# Patient Record
Sex: MALE | Race: BLACK OR AFRICAN AMERICAN | NOT HISPANIC OR LATINO | Employment: FULL TIME | URBAN - METROPOLITAN AREA
[De-identification: names, ages, dates, MRNs, and addresses within clinical notes are randomized per-mention and may not be internally consistent; named-entity substitution may affect disease eponyms.]

---

## 2019-10-25 ENCOUNTER — HOSPITAL ENCOUNTER (EMERGENCY)
Facility: HOSPITAL | Age: 29
Discharge: HOME | End: 2019-10-25
Attending: FAMILY MEDICINE

## 2019-10-25 VITALS
HEART RATE: 91 BPM | BODY MASS INDEX: 46.65 KG/M2 | OXYGEN SATURATION: 96 % | TEMPERATURE: 98 F | HEIGHT: 69 IN | WEIGHT: 315 LBS | RESPIRATION RATE: 18 BRPM | SYSTOLIC BLOOD PRESSURE: 142 MMHG | DIASTOLIC BLOOD PRESSURE: 82 MMHG

## 2019-10-25 DIAGNOSIS — M79.604 RIGHT LEG PAIN: ICD-10-CM

## 2019-10-25 DIAGNOSIS — L03.115 CELLULITIS OF RIGHT LOWER EXTREMITY: Primary | ICD-10-CM

## 2019-10-25 LAB
ALBUMIN SERPL BCP-MCNC: 3.6 G/DL (ref 3.5–5.2)
ALP SERPL-CCNC: 70 U/L (ref 55–135)
ALT SERPL W/O P-5'-P-CCNC: 19 U/L (ref 10–44)
ANION GAP SERPL CALC-SCNC: 14 MMOL/L (ref 8–16)
AST SERPL-CCNC: 18 U/L (ref 10–40)
BASOPHILS # BLD AUTO: 0.02 K/UL (ref 0–0.2)
BASOPHILS NFR BLD: 0.1 % (ref 0–1.9)
BILIRUB SERPL-MCNC: 1.4 MG/DL (ref 0.1–1)
BUN SERPL-MCNC: 23 MG/DL (ref 6–20)
CALCIUM SERPL-MCNC: 9.6 MG/DL (ref 8.7–10.5)
CHLORIDE SERPL-SCNC: 102 MMOL/L (ref 95–110)
CO2 SERPL-SCNC: 22 MMOL/L (ref 23–29)
CREAT SERPL-MCNC: 1.7 MG/DL (ref 0.5–1.4)
DIFFERENTIAL METHOD: ABNORMAL
EOSINOPHIL # BLD AUTO: 0 K/UL (ref 0–0.5)
EOSINOPHIL NFR BLD: 0.3 % (ref 0–8)
ERYTHROCYTE [DISTWIDTH] IN BLOOD BY AUTOMATED COUNT: 12.9 % (ref 11.5–14.5)
EST. GFR  (AFRICAN AMERICAN): >60 ML/MIN/1.73 M^2
EST. GFR  (NON AFRICAN AMERICAN): 53 ML/MIN/1.73 M^2
GLUCOSE SERPL-MCNC: 115 MG/DL (ref 70–110)
HCT VFR BLD AUTO: 38.6 % (ref 40–54)
HGB BLD-MCNC: 12.4 G/DL (ref 14–18)
IMM GRANULOCYTES # BLD AUTO: 0.08 K/UL (ref 0–0.04)
IMM GRANULOCYTES NFR BLD AUTO: 0.5 % (ref 0–0.5)
LACTATE SERPL-SCNC: 0.7 MMOL/L (ref 0.5–2.2)
LYMPHOCYTES # BLD AUTO: 2.3 K/UL (ref 1–4.8)
LYMPHOCYTES NFR BLD: 15 % (ref 18–48)
MCH RBC QN AUTO: 26.8 PG (ref 27–31)
MCHC RBC AUTO-ENTMCNC: 32.1 G/DL (ref 32–36)
MCV RBC AUTO: 83 FL (ref 82–98)
MONOCYTES # BLD AUTO: 1.6 K/UL (ref 0.3–1)
MONOCYTES NFR BLD: 10.3 % (ref 4–15)
NEUTROPHILS # BLD AUTO: 11.3 K/UL (ref 1.8–7.7)
NEUTROPHILS NFR BLD: 73.8 % (ref 38–73)
NRBC BLD-RTO: 0 /100 WBC
PLATELET # BLD AUTO: 243 K/UL (ref 150–350)
PMV BLD AUTO: 11.1 FL (ref 9.2–12.9)
POTASSIUM SERPL-SCNC: 4.1 MMOL/L (ref 3.5–5.1)
PROT SERPL-MCNC: 8.3 G/DL (ref 6–8.4)
RBC # BLD AUTO: 4.63 M/UL (ref 4.6–6.2)
SODIUM SERPL-SCNC: 138 MMOL/L (ref 136–145)
WBC # BLD AUTO: 15.29 K/UL (ref 3.9–12.7)

## 2019-10-25 PROCEDURE — 85025 COMPLETE CBC W/AUTO DIFF WBC: CPT

## 2019-10-25 PROCEDURE — 83605 ASSAY OF LACTIC ACID: CPT

## 2019-10-25 PROCEDURE — 87040 BLOOD CULTURE FOR BACTERIA: CPT

## 2019-10-25 PROCEDURE — 80053 COMPREHEN METABOLIC PANEL: CPT

## 2019-10-25 PROCEDURE — 63600175 PHARM REV CODE 636 W HCPCS: Performed by: FAMILY MEDICINE

## 2019-10-25 PROCEDURE — 96365 THER/PROPH/DIAG IV INF INIT: CPT

## 2019-10-25 PROCEDURE — 99284 EMERGENCY DEPT VISIT MOD MDM: CPT | Mod: 25

## 2019-10-25 PROCEDURE — 96366 THER/PROPH/DIAG IV INF ADDON: CPT

## 2019-10-25 PROCEDURE — 25000003 PHARM REV CODE 250: Performed by: FAMILY MEDICINE

## 2019-10-25 RX ORDER — IBUPROFEN 800 MG/1
800 TABLET ORAL
Status: COMPLETED | OUTPATIENT
Start: 2019-10-25 | End: 2019-10-25

## 2019-10-25 RX ORDER — CLINDAMYCIN HYDROCHLORIDE 150 MG/1
450 CAPSULE ORAL EVERY 8 HOURS
Qty: 45 CAPSULE | Refills: 0 | Status: SHIPPED | OUTPATIENT
Start: 2019-10-25 | End: 2019-10-30

## 2019-10-25 RX ORDER — IRBESARTAN 150 MG/1
150 TABLET ORAL
COMMUNITY
Start: 2019-05-27

## 2019-10-25 RX ORDER — ATORVASTATIN CALCIUM 10 MG/1
10 TABLET, FILM COATED ORAL
COMMUNITY
Start: 2019-05-28

## 2019-10-25 RX ADMIN — IBUPROFEN 800 MG: 800 TABLET, FILM COATED ORAL at 04:10

## 2019-10-25 RX ADMIN — VANCOMYCIN HYDROCHLORIDE 3000 MG: 100 INJECTION, POWDER, LYOPHILIZED, FOR SOLUTION INTRAVENOUS at 06:10

## 2019-10-25 NOTE — ED NOTES
Report received from Cat, Rn - Introduced self to patient and updated whiteboard. The patient is resting quietly, eyes closed, arouses easily to stimuli. Airway is open and patent, respirations are spontaneous, normal respiratory effort and rate noted, skin warm and dry, appearance: in no acute distress and resting comfortably. IV is patent and infusing at this time with no signs of infiltration.

## 2019-10-25 NOTE — ED PROVIDER NOTES
SCRIBE #1 NOTE: I, Lorrie Crowell, am scribing for, and in the presence of, Alisia Chavez MD. I have scribed the HPI, ROS, and PEx.     SCRIBE #2 NOTE: I, Rafael Peres, am scribing for, and in the presence of,  Marty Gomez MD. I have scribed the remaining portions of the note not scribed by Scribe #1.     History      Chief Complaint   Patient presents with    Cellulitis     redness, swelling and pain to R lower extremity       Review of patient's allergies indicates:  No Known Allergies     HPI   HPI    10/25/2019, 4:45 AM   History obtained from the patient      History of Present Illness: Jorge Zuñiga is a 29 y.o. male patient who presents to the Emergency Department for evaluation of cellulitis to RLE. Symptoms are constant and moderate in severity. Patient reports having a similar episode 2 years ago and treated with abx. No mitigating or exacerbating factors reported. Associated sxs include RLE swelling and pain. Patient denies any fever, diaphoresis, N/VD, extremity numbness/weakness, CP, SOB, and all other sxs at this time. No prior Tx. No further complaints or concerns at this time.       Arrival mode: Personal vehicle     PCP: Jaya Linder MD     Past Medical History:  Past medical history reviewed not relevant      Past Surgical History:  Past surgical history reviewed not relevant      Family History:  Family history reviewed not relevant    Social History:  Social History     Tobacco Use    Smoking status: Unknown   Substance and Sexual Activity    Alcohol use: Unknown    Drug use: Unknown    Sexual activity: Unknown       ROS   Review of Systems   Constitutional: Negative for diaphoresis and fever.   HENT: Negative for sore throat.    Respiratory: Negative for shortness of breath.    Cardiovascular: Negative for chest pain.   Gastrointestinal: Negative for diarrhea, nausea and vomiting.   Genitourinary: Negative for dysuria.   Musculoskeletal: Negative for back pain.        (+) R  "leg pain + swelling   Skin: Positive for color change (RLE). Negative for rash.   Neurological: Negative for weakness and numbness.   Hematological: Does not bruise/bleed easily.   All other systems reviewed and are negative.    Physical Exam      Initial Vitals [10/25/19 0347]   BP Pulse Resp Temp SpO2   115/70 (!) 112 20 (!) 100.8 °F (38.2 °C) 95 %      MAP       --          Physical Exam  Nursing Notes and Vital Signs Reviewed.  Constitutional: Patient is in no acute distress. Well-developed and well-nourished.  Head: Atraumatic. Normocephalic.  Eyes: PERRL. EOM intact. Conjunctivae are not pale. No scleral icterus.  ENT: Mucous membranes are moist.    Neck: Supple. Full ROM. No lymphadenopathy.  Cardiovascular: Regular rate. Regular rhythm. No murmurs, rubs, or gallops. Distal pulses are 2+ and symmetric.  Pulmonary/Chest: No respiratory distress. Clear to auscultation bilaterally. No wheezing or rales.  Abdominal: Soft and non-distended.  There is no tenderness.  RLE: no evident deformity. There is swelling, erythema, and increased warmth. Tender to palpation. No drainage. No fluctuance. ROM is normal. Cap refill distally is <2 seconds. DP and PT pulses are equal and 2+ bilaterally. No motor deficit. No distal sensory deficit  Musculoskeletal: Moves all extremities. No obvious deformities. No edema. No calf tenderness.  Skin: Warm and dry.  Neurological:  Alert, awake, and appropriate.  Normal speech.  No acute focal neurological deficits are appreciated.  Psychiatric: Normal affect. Good eye contact. Appropriate in content.    ED Course    Procedures  ED Vital Signs:  Vitals:    10/25/19 0347   BP: 115/70   Pulse: (!) 112   Resp: 20   Temp: (!) 100.8 °F (38.2 °C)   TempSrc: Oral   SpO2: 95%   Weight: (!) 168.6 kg (371 lb 11.1 oz)   Height: 5' 9" (1.753 m)       Abnormal Lab Results:  Labs Reviewed   CBC W/ AUTO DIFFERENTIAL - Abnormal; Notable for the following components:       Result Value    WBC 15.29 (*)  "    Hemoglobin 12.4 (*)     Hematocrit 38.6 (*)     Mean Corpuscular Hemoglobin 26.8 (*)     Gran # (ANC) 11.3 (*)     Immature Grans (Abs) 0.08 (*)     Mono # 1.6 (*)     Gran% 73.8 (*)     Lymph% 15.0 (*)     All other components within normal limits   COMPREHENSIVE METABOLIC PANEL - Abnormal; Notable for the following components:    CO2 22 (*)     Glucose 115 (*)     BUN, Bld 23 (*)     Creatinine 1.7 (*)     Total Bilirubin 1.4 (*)     eGFR if non  53 (*)     All other components within normal limits   CULTURE, BLOOD   CULTURE, BLOOD   LACTIC ACID, PLASMA        All Lab Results:  Results for orders placed or performed during the hospital encounter of 10/25/19   CBC auto differential   Result Value Ref Range    WBC 15.29 (H) 3.90 - 12.70 K/uL    RBC 4.63 4.60 - 6.20 M/uL    Hemoglobin 12.4 (L) 14.0 - 18.0 g/dL    Hematocrit 38.6 (L) 40.0 - 54.0 %    Mean Corpuscular Volume 83 82 - 98 fL    Mean Corpuscular Hemoglobin 26.8 (L) 27.0 - 31.0 pg    Mean Corpuscular Hemoglobin Conc 32.1 32.0 - 36.0 g/dL    RDW 12.9 11.5 - 14.5 %    Platelets 243 150 - 350 K/uL    MPV 11.1 9.2 - 12.9 fL    Immature Granulocytes 0.5 0.0 - 0.5 %    Gran # (ANC) 11.3 (H) 1.8 - 7.7 K/uL    Immature Grans (Abs) 0.08 (H) 0.00 - 0.04 K/uL    Lymph # 2.3 1.0 - 4.8 K/uL    Mono # 1.6 (H) 0.3 - 1.0 K/uL    Eos # 0.0 0.0 - 0.5 K/uL    Baso # 0.02 0.00 - 0.20 K/uL    nRBC 0 0 /100 WBC    Gran% 73.8 (H) 38.0 - 73.0 %    Lymph% 15.0 (L) 18.0 - 48.0 %    Mono% 10.3 4.0 - 15.0 %    Eosinophil% 0.3 0.0 - 8.0 %    Basophil% 0.1 0.0 - 1.9 %    Differential Method Automated    Comprehensive metabolic panel   Result Value Ref Range    Sodium 138 136 - 145 mmol/L    Potassium 4.1 3.5 - 5.1 mmol/L    Chloride 102 95 - 110 mmol/L    CO2 22 (L) 23 - 29 mmol/L    Glucose 115 (H) 70 - 110 mg/dL    BUN, Bld 23 (H) 6 - 20 mg/dL    Creatinine 1.7 (H) 0.5 - 1.4 mg/dL    Calcium 9.6 8.7 - 10.5 mg/dL    Total Protein 8.3 6.0 - 8.4 g/dL    Albumin 3.6 3.5  - 5.2 g/dL    Total Bilirubin 1.4 (H) 0.1 - 1.0 mg/dL    Alkaline Phosphatase 70 55 - 135 U/L    AST 18 10 - 40 U/L    ALT 19 10 - 44 U/L    Anion Gap 14 8 - 16 mmol/L    eGFR if African American >60 >60 mL/min/1.73 m^2    eGFR if non African American 53 (A) >60 mL/min/1.73 m^2   Lactic acid, plasma   Result Value Ref Range    Lactate (Lactic Acid) 0.7 0.5 - 2.2 mmol/L           Imaging Results:  Imaging Results          US Lower Extremity Veins Right (Final result)  Result time 10/25/19 06:43:25    Final result by Kristian Valenzuela MD (10/25/19 06:43:25)                 Impression:      No evidence of deep venous thrombosis in the right lower extremity.      Electronically signed by: Kristian Valenzuela MD  Date:    10/25/2019  Time:    06:43             Narrative:    EXAMINATION:  US LOWER EXTREMITY VEINS RIGHT    CLINICAL HISTORY:  Pain in right leg    TECHNIQUE:  Duplex and color flow Doppler evaluation and graded compression of the right lower extremity veins was performed.    COMPARISON:  None    FINDINGS:  Right thigh veins: The common femoral, femoral, popliteal, upper greater saphenous, and deep femoral veins are patent and free of thrombus. The veins are normally compressible and have normal phasic flow and augmentation response.    Right calf veins: The visualized calf veins are patent.    Miscellaneous: None                            6:33 AM: Per STAT radiology, pt's US duplex R lower extremity veins results: No venous thrombosis.            The Emergency Provider reviewed the vital signs and test results, which are outlined above.    ED Discussion     6:07 AM: Dr. Chavez transfers care of pt to Dr. Gomez pending imaging results.    6:19 AM: Reassessed pt at this time.  Pt states his condition has improved at this time. Discussed with pt all pertinent ED information and results. Discussed pt dx and plan of tx. Gave pt all f/u and return to the ED instructions. All questions and concerns were addressed at  this time. Pt expresses understanding of information and instructions, and is comfortable with plan to discharge. Pt is stable for discharge.    I discussed with patient and/or family/caretaker that evaluation in the ED does not suggest any emergent or life threatening medical conditions requiring immediate intervention beyond what was provided in the ED, and I believe patient is safe for discharge.  Regardless, an unremarkable evaluation in the ED does not preclude the development or presence of a serious of life threatening condition. As such, patient was instructed to return immediately for any worsening or change in current symptoms.      ED Medication(s):  Medications   vancomycin (VANCOCIN) 3,000 mg in dextrose 5 % 500 mL IVPB (has no administration in time range)   ibuprofen tablet 800 mg (800 mg Oral Given 10/25/19 5968)       Follow-up Information     Jaya Montoya MD.    Specialty:  Family Medicine  Contact information:  [id number]  [facility] [address]  [phone]                     Medical Decision Making    Medical Decision Making:   Clinical Tests:   Lab Tests: Ordered and Reviewed  Radiological Study: Ordered and Reviewed           Scribe Attestation:   Scribe #1: I performed the above scribed service and the documentation accurately describes the services I performed. I attest to the accuracy of the note.    Attending:   Physician Attestation Statement for Scribe #1: I, Alisia Chavez MD, personally performed the services described in this documentation, as scribed by Lorrie Crowell, in my presence, and it is both accurate and complete.       Scribe Attestation:   Scribe #2: I performed the above scribed service and the documentation accurately describes the services I performed. I attest to the accuracy of the note.    Attending Attestation:           Physician Attestation for Scribe:    Physician Attestation Statement for Scribe #2: I, Marty Gomez MD, reviewed  documentation, as scribed by Rafael Peres in my presence, and it is both accurate and complete. I also acknowledge and confirm the content of the note done by Scribe #1.          Clinical Impression       ICD-10-CM ICD-9-CM   1. Cellulitis of right lower extremity L03.115 682.6   2. Right leg pain M79.604 729.5       Disposition:   Disposition: Discharged  Condition: Stable         Marty Gomez MD  10/25/19 0647

## 2019-10-30 LAB
BACTERIA BLD CULT: NORMAL
BACTERIA BLD CULT: NORMAL

## 2024-05-10 ENCOUNTER — OFFICE VISIT (OUTPATIENT)
Dept: PRIMARY CARE CLINIC | Facility: CLINIC | Age: 34
End: 2024-05-10
Payer: COMMERCIAL

## 2024-05-10 VITALS
OXYGEN SATURATION: 97 % | HEIGHT: 69 IN | WEIGHT: 315 LBS | BODY MASS INDEX: 46.65 KG/M2 | DIASTOLIC BLOOD PRESSURE: 100 MMHG | SYSTOLIC BLOOD PRESSURE: 130 MMHG | TEMPERATURE: 97 F | HEART RATE: 118 BPM

## 2024-05-10 DIAGNOSIS — Z00.00 ENCOUNTER FOR MEDICAL EXAMINATION TO ESTABLISH CARE: Primary | ICD-10-CM

## 2024-05-10 DIAGNOSIS — I51.7 CARDIOMEGALY: ICD-10-CM

## 2024-05-10 DIAGNOSIS — Z87.891 FORMER TOBACCO USE: ICD-10-CM

## 2024-05-10 DIAGNOSIS — I50.9 CHRONIC CONGESTIVE HEART FAILURE, UNSPECIFIED HEART FAILURE TYPE: ICD-10-CM

## 2024-05-10 DIAGNOSIS — Z00.00 PREVENTATIVE HEALTH CARE: ICD-10-CM

## 2024-05-10 DIAGNOSIS — I10 ESSENTIAL HYPERTENSION: ICD-10-CM

## 2024-05-10 PROCEDURE — 99205 OFFICE O/P NEW HI 60 MIN: CPT | Mod: S$GLB,,, | Performed by: INTERNAL MEDICINE

## 2024-05-10 PROCEDURE — 3080F DIAST BP >= 90 MM HG: CPT | Mod: CPTII,S$GLB,, | Performed by: INTERNAL MEDICINE

## 2024-05-10 PROCEDURE — 4010F ACE/ARB THERAPY RXD/TAKEN: CPT | Mod: CPTII,S$GLB,, | Performed by: INTERNAL MEDICINE

## 2024-05-10 PROCEDURE — 99999 PR PBB SHADOW E&M-NEW PATIENT-LVL V: CPT | Mod: PBBFAC,,, | Performed by: INTERNAL MEDICINE

## 2024-05-10 PROCEDURE — 1159F MED LIST DOCD IN RCRD: CPT | Mod: CPTII,S$GLB,, | Performed by: INTERNAL MEDICINE

## 2024-05-10 PROCEDURE — 3075F SYST BP GE 130 - 139MM HG: CPT | Mod: CPTII,S$GLB,, | Performed by: INTERNAL MEDICINE

## 2024-05-10 PROCEDURE — 3008F BODY MASS INDEX DOCD: CPT | Mod: CPTII,S$GLB,, | Performed by: INTERNAL MEDICINE

## 2024-05-10 PROCEDURE — 1160F RVW MEDS BY RX/DR IN RCRD: CPT | Mod: CPTII,S$GLB,, | Performed by: INTERNAL MEDICINE

## 2024-05-10 RX ORDER — CETIRIZINE HYDROCHLORIDE 10 MG/1
10 TABLET ORAL EVERY MORNING
COMMUNITY
Start: 2024-03-29 | End: 2024-05-28

## 2024-05-10 RX ORDER — AMLODIPINE BESYLATE 10 MG/1
1 TABLET ORAL EVERY MORNING
COMMUNITY
End: 2024-05-10

## 2024-05-10 RX ORDER — AMLODIPINE BESYLATE 5 MG/1
5 TABLET ORAL DAILY
Qty: 90 TABLET | Refills: 1 | Status: SHIPPED | OUTPATIENT
Start: 2024-05-10 | End: 2024-05-15

## 2024-05-10 RX ORDER — FUROSEMIDE 20 MG/1
20 TABLET ORAL DAILY
Qty: 90 TABLET | Refills: 1 | Status: SHIPPED | OUTPATIENT
Start: 2024-05-10 | End: 2024-05-16 | Stop reason: SDUPTHER

## 2024-05-10 RX ORDER — ALBUTEROL SULFATE 90 UG/1
2 AEROSOL, METERED RESPIRATORY (INHALATION) EVERY 4 HOURS PRN
COMMUNITY
Start: 2024-03-29

## 2024-05-10 RX ORDER — FLUTICASONE PROPIONATE 50 MCG
2 SPRAY, SUSPENSION (ML) NASAL
COMMUNITY
Start: 2024-03-29 | End: 2025-03-29

## 2024-05-10 RX ORDER — AZELASTINE 1 MG/ML
2 SPRAY, METERED NASAL
COMMUNITY
Start: 2024-03-29 | End: 2025-03-29

## 2024-05-10 RX ORDER — FUROSEMIDE 20 MG/1
20 TABLET ORAL
COMMUNITY
End: 2024-05-10 | Stop reason: SDUPTHER

## 2024-05-10 NOTE — PROGRESS NOTES
Subjective     Patient ID: Jorge Zuñiga is a 33 y.o. male.    Chief Complaint: Establish Care      HPI here to establish care.  Longstanding hypertension not controlled.  He has a former patient of an outside facility.  Apparently he was diagnosed with a cardiomyopathy and was put on Entresto through the outside cardiologist.  However, it was extremely expensive and he was unable to continue to afford this.  He was also supposed to be scheduled for a left and right heart catheterization but because he was told his from payment was nearly a 1000 dollars, he did not proceed with it because he could not afford that at that time.  We talked extensively about preventative health care and low-salt diet as well as heart health.  He does not have any acute concerns.  Does note that when he does eat higher salt he feels more comfortable sleeping in a recliner but this has been longstanding for the last few years.    Narrative    XR CHEST 2 VIEW PA AND LATERAL    CLINICAL INDICATION: R05.1:Acute cough    COMPARISON: 2/17/2022 view chest radiograph    TECHNIQUE: A 2 view chest radiograph was performed.    FINDINGS: There is moderate cardiomegaly. There is cephalization of the pulmonary blood flow but there is no evidence of any perihilar edema or significant congestion. No obvious pleural effusion. No pneumothorax. The lungs are clear.    The regional skeleton is intact.  Exam End: 03/29/24 12:40 Last Resulted: 03/29/24 12:48   [facility]  Result Received: 05/09/24 15:47     History reviewed. No pertinent past medical history.  Review of patient's allergies indicates:  No Known Allergies  History reviewed. No pertinent surgical history.  No family history on file.  Social History     Socioeconomic History    Marital status:    Tobacco Use    Smoking status: Former     Types: Cigarettes     Passive exposure: Past     "Smokeless tobacco: Never         BP (!) 130/100 (BP Location: Right arm) Comment: 140/100  Pulse (!) 118   Temp 97 °F (36.1 °C) (Tympanic)   Ht 5' 9" (1.753 m)   Wt (!) 158.4 kg (349 lb 3.3 oz)   SpO2 97%   BMI 51.57 kg/m²   Outpatient Medications as of 5/10/2024   Medication Sig Dispense Refill    albuterol (PROVENTIL/VENTOLIN HFA) 90 mcg/actuation inhaler Inhale 2 puffs into the lungs every 4 (four) hours as needed.      azelastine (ASTELIN) 137 mcg (0.1 %) nasal spray 2 sprays by Nasal route.      cetirizine (ZYRTEC) 10 MG tablet Take 10 mg by mouth every morning.      fluticasone propionate (FLONASE) 50 mcg/actuation nasal spray 2 sprays by Nasal route.       No current facility-administered medications on file as of 5/10/2024.       Review of Systems   All other systems reviewed and are negative.         Objective     Physical Exam  Constitutional:       General: He is not in acute distress.     Appearance: Normal appearance. He is not ill-appearing, toxic-appearing or diaphoretic.   HENT:      Head: Normocephalic and atraumatic.      Mouth/Throat:      Mouth: Mucous membranes are moist.   Eyes:      Conjunctiva/sclera: Conjunctivae normal.   Cardiovascular:      Rate and Rhythm: Normal rate and regular rhythm.      Heart sounds: No murmur heard.     No friction rub. No gallop.   Pulmonary:      Effort: Pulmonary effort is normal. No respiratory distress.      Breath sounds: Normal breath sounds. No wheezing or rales.   Musculoskeletal:      Cervical back: Neck supple.   Skin:     General: Skin is dry.   Neurological:      General: No focal deficit present.      Mental Status: He is alert and oriented to person, place, and time.   Psychiatric:         Mood and Affect: Mood normal.         Behavior: Behavior normal.            Assessment and Plan     1. Encounter for medical examination to establish care    2. Essential hypertension  -     sacubitriL-valsartan (ENTRESTO) 24-26 mg per tablet; Take 1 tablet " by mouth 2 (two) times daily.  Dispense: 60 tablet; Refill: 5  -     Ambulatory referral/consult to Cardiology; Future; Expected date: 05/17/2024  -     amLODIPine (NORVASC) 5 MG tablet; Take 1 tablet (5 mg total) by mouth once daily.  Dispense: 90 tablet; Refill: 1    3. Former tobacco use    4. BMI 50.0-59.9, adult    5. Cardiomegaly  -     sacubitriL-valsartan (ENTRESTO) 24-26 mg per tablet; Take 1 tablet by mouth 2 (two) times daily.  Dispense: 60 tablet; Refill: 5  -     Ambulatory referral/consult to Cardiology; Future; Expected date: 05/17/2024    6. Chronic congestive heart failure, unspecified heart failure type  -     sacubitriL-valsartan (ENTRESTO) 24-26 mg per tablet; Take 1 tablet by mouth 2 (two) times daily.  Dispense: 60 tablet; Refill: 5  -     furosemide (LASIX) 20 MG tablet; Take 1 tablet (20 mg total) by mouth once daily.  Dispense: 90 tablet; Refill: 1  -     Ambulatory referral/consult to Cardiology; Future; Expected date: 05/17/2024    7. Preventative health care  -     Lipid Panel; Future; Expected date: 05/10/2024  -     Microalbumin/Creatinine Ratio, Urine; Future; Expected date: 05/10/2024  -     Urinalysis; Future; Expected date: 05/10/2024  -     Hemoglobin A1C; Future; Expected date: 05/10/2024  -     TSH; Future; Expected date: 05/10/2024  -     Comprehensive Metabolic Panel; Future; Expected date: 05/10/2024  -     CBC Auto Differential; Future; Expected date: 05/10/2024  -     Insulin, Random; Future; Expected date: 05/10/2024       Dash diet/lsm d/w pt in detail.  Also counseled pt on financial counseling programs through any healthcare system in order to advocate for himself should he need help with this as it seems this is what stopped him from proceeding with c/rhc.    Follow up in about 2 weeks (around 5/24/2024).      There is no immunization history on file for this patient.    I spent a total of 60 minutes on the day of the visit.  This includes face to face time and  non-face to face time preparing to see the patient (eg, review of tests), obtaining and/or reviewing separately obtained history, documenting clinical information in the electronic or other health record, independently interpreting results and communicating results to the patient/family/caregiver, or care coordinator.

## 2024-05-13 PROBLEM — I51.7 CARDIOMEGALY: Status: ACTIVE | Noted: 2024-05-13

## 2024-05-13 PROBLEM — I50.9 CHRONIC CONGESTIVE HEART FAILURE: Status: ACTIVE | Noted: 2024-05-13

## 2024-05-13 PROBLEM — I10 ESSENTIAL HYPERTENSION: Status: ACTIVE | Noted: 2024-05-13

## 2024-05-13 PROBLEM — Z87.891 FORMER TOBACCO USE: Status: ACTIVE | Noted: 2024-05-13

## 2024-05-14 DIAGNOSIS — Z00.00 ROUTINE ADULT HEALTH MAINTENANCE: Primary | ICD-10-CM

## 2024-05-14 DIAGNOSIS — Z76.89 ENCOUNTER TO ESTABLISH CARE: ICD-10-CM

## 2024-05-15 ENCOUNTER — OFFICE VISIT (OUTPATIENT)
Dept: CARDIOLOGY | Facility: CLINIC | Age: 34
End: 2024-05-15
Payer: COMMERCIAL

## 2024-05-15 ENCOUNTER — HOSPITAL ENCOUNTER (OUTPATIENT)
Dept: CARDIOLOGY | Facility: HOSPITAL | Age: 34
Discharge: HOME | End: 2024-05-15
Attending: INTERNAL MEDICINE
Payer: COMMERCIAL

## 2024-05-15 VITALS
HEIGHT: 69 IN | SYSTOLIC BLOOD PRESSURE: 144 MMHG | OXYGEN SATURATION: 96 % | WEIGHT: 315 LBS | HEART RATE: 116 BPM | BODY MASS INDEX: 46.65 KG/M2 | DIASTOLIC BLOOD PRESSURE: 102 MMHG

## 2024-05-15 DIAGNOSIS — Z76.89 ENCOUNTER TO ESTABLISH CARE: ICD-10-CM

## 2024-05-15 DIAGNOSIS — I10 ESSENTIAL HYPERTENSION: ICD-10-CM

## 2024-05-15 DIAGNOSIS — I51.7 CARDIOMEGALY: ICD-10-CM

## 2024-05-15 DIAGNOSIS — Z00.00 ROUTINE ADULT HEALTH MAINTENANCE: ICD-10-CM

## 2024-05-15 DIAGNOSIS — Z87.891 FORMER TOBACCO USE: ICD-10-CM

## 2024-05-15 DIAGNOSIS — I50.9 CHRONIC CONGESTIVE HEART FAILURE, UNSPECIFIED HEART FAILURE TYPE: ICD-10-CM

## 2024-05-15 DIAGNOSIS — I50.43 CLASS 3 CONGESTIVE HEART FAILURE, ACUTE ON CHRONIC, COMBINED: Primary | ICD-10-CM

## 2024-05-15 PROBLEM — E78.00 PURE HYPERCHOLESTEROLEMIA: Status: ACTIVE | Noted: 2019-05-28

## 2024-05-15 PROCEDURE — G2211 COMPLEX E/M VISIT ADD ON: HCPCS | Mod: S$GLB,,, | Performed by: INTERNAL MEDICINE

## 2024-05-15 PROCEDURE — 1160F RVW MEDS BY RX/DR IN RCRD: CPT | Mod: CPTII,S$GLB,, | Performed by: INTERNAL MEDICINE

## 2024-05-15 PROCEDURE — 3080F DIAST BP >= 90 MM HG: CPT | Mod: CPTII,S$GLB,, | Performed by: INTERNAL MEDICINE

## 2024-05-15 PROCEDURE — 1159F MED LIST DOCD IN RCRD: CPT | Mod: CPTII,S$GLB,, | Performed by: INTERNAL MEDICINE

## 2024-05-15 PROCEDURE — 93005 ELECTROCARDIOGRAM TRACING: CPT

## 2024-05-15 PROCEDURE — 99999 PR PBB SHADOW E&M-EST. PATIENT-LVL IV: CPT | Mod: PBBFAC,,, | Performed by: INTERNAL MEDICINE

## 2024-05-15 PROCEDURE — 3077F SYST BP >= 140 MM HG: CPT | Mod: CPTII,S$GLB,, | Performed by: INTERNAL MEDICINE

## 2024-05-15 PROCEDURE — 4010F ACE/ARB THERAPY RXD/TAKEN: CPT | Mod: CPTII,S$GLB,, | Performed by: INTERNAL MEDICINE

## 2024-05-15 PROCEDURE — 93010 ELECTROCARDIOGRAM REPORT: CPT | Mod: ,,, | Performed by: INTERNAL MEDICINE

## 2024-05-15 PROCEDURE — 3008F BODY MASS INDEX DOCD: CPT | Mod: CPTII,S$GLB,, | Performed by: INTERNAL MEDICINE

## 2024-05-15 PROCEDURE — 99205 OFFICE O/P NEW HI 60 MIN: CPT | Mod: S$GLB,,, | Performed by: INTERNAL MEDICINE

## 2024-05-15 RX ORDER — CARVEDILOL 6.25 MG/1
6.25 TABLET ORAL 2 TIMES DAILY WITH MEALS
Qty: 60 TABLET | Refills: 3 | Status: SHIPPED | OUTPATIENT
Start: 2024-05-15 | End: 2025-05-15

## 2024-05-15 RX ORDER — SACUBITRIL AND VALSARTAN 49; 51 MG/1; MG/1
1 TABLET, FILM COATED ORAL 2 TIMES DAILY
Qty: 60 TABLET | Refills: 3 | Status: SHIPPED | OUTPATIENT
Start: 2024-05-15

## 2024-05-15 NOTE — PROGRESS NOTES
Subjective:   Patient ID:  Jorge Zuñiga is a 33 y.o. male who presents for cardiac consult of Annual Exam (Referred by Dr. Aguilera.)      Referral by: Mercedes Aguilera Md  [id number] [facility]  [place] [address]     Reason for consult: CHF      HPI  The patient came in today for cardiac consult of Annual Exam (Referred by Dr. Aguilera.)      Jorge Zuñiga is a 33 y.o. male pt with HTN, CHF, tobacco use, obesity presents for CVD eval.     Per PCP  HPI here to establish care.  Longstanding hypertension not controlled.  He has a former patient of an outside facility.  Apparently he was diagnosed with a cardiomyopathy and was put on Entresto through the outside cardiologist.  However, it was extremely expensive and he was unable to continue to afford this.  He was also supposed to be scheduled for a left and right heart catheterization but because he was told his from payment was nearly a 1000 dollars, he did not proceed with it because he could not afford that at that time.  We talked extensively about preventative health care and low-salt diet as well as heart health.  He does not have any acute concerns.  Does note that when he does eat higher salt he feels more comfortable sleeping in a recliner but this has been longstanding for the last few years.     5/15/24  BP today elevated 144/102, . BMI 50 - 344 lbs  Since last year he has been seen Dr. Paul Holt - [facility] - was told to get R/LHC.   He had SOB last year appx and stopped smoking and improved but the end of year started having SOB and more edema.   He now has issues with laying flat as well    He has started Entersto and was told $700     ECG - sinus tach, Vrate 110, LAE, LVH    FH - father -  younger    No cardiac monitor results found for the past 12 months         History reviewed. No pertinent past medical history.    History reviewed. No pertinent surgical history.    Social History     Tobacco Use    Smoking status:  Former     Types: Cigarettes     Passive exposure: Past    Smokeless tobacco: Never   Substance Use Topics    Alcohol use: Yes    Drug use: Never       No family history on file.    Patient's Medications   New Prescriptions    CARVEDILOL (COREG) 6.25 MG TABLET    Take 1 tablet (6.25 mg total) by mouth 2 (two) times daily with meals.    SACUBITRIL-VALSARTAN (ENTRESTO) 49-51 MG PER TABLET    Take 1 tablet by mouth 2 (two) times daily.   Previous Medications    ALBUTEROL (PROVENTIL/VENTOLIN HFA) 90 MCG/ACTUATION INHALER    Inhale 2 puffs into the lungs every 4 (four) hours as needed.    AZELASTINE (ASTELIN) 137 MCG (0.1 %) NASAL SPRAY    2 sprays by Nasal route.    CETIRIZINE (ZYRTEC) 10 MG TABLET    Take 10 mg by mouth every morning.    FLUTICASONE PROPIONATE (FLONASE) 50 MCG/ACTUATION NASAL SPRAY    2 sprays by Nasal route.    FUROSEMIDE (LASIX) 20 MG TABLET    Take 1 tablet (20 mg total) by mouth once daily.   Modified Medications    No medications on file   Discontinued Medications    AMLODIPINE (NORVASC) 5 MG TABLET    Take 1 tablet (5 mg total) by mouth once daily.    SACUBITRIL-VALSARTAN (ENTRESTO) 24-26 MG PER TABLET    Take 1 tablet by mouth 2 (two) times daily.       Review of Systems   Constitutional:  Positive for malaise/fatigue.   HENT: Negative.     Eyes: Negative.    Respiratory:  Positive for shortness of breath.    Cardiovascular:  Positive for orthopnea and leg swelling.   Gastrointestinal: Negative.    Genitourinary: Negative.    Musculoskeletal: Negative.    Skin: Negative.    Neurological: Negative.    Endo/Heme/Allergies: Negative.    Psychiatric/Behavioral: Negative.     All 12 systems otherwise negative.      Wt Readings from Last 3 Encounters:   05/15/24 (!) 156.3 kg (344 lb 9.3 oz)   05/10/24 (!) 158.4 kg (349 lb 3.3 oz)   10/25/19 (!) 168.6 kg (371 lb 11.1 oz)     Temp Readings from Last 3 Encounters:   05/10/24 97 °F (36.1 °C) (Tympanic)   10/25/19 98 °F (36.7 °C) (Oral)     BP Readings  "from Last 3 Encounters:   05/15/24 (!) 144/102   05/10/24 (!) 130/100   10/25/19 (!) 142/82     Pulse Readings from Last 3 Encounters:   05/15/24 (!) 116   05/10/24 (!) 118   10/25/19 91       BP (!) 144/102 (BP Location: Right arm, Patient Position: Sitting, BP Method: Large (Manual))   Pulse (!) 116   Ht 5' 9" (1.753 m)   Wt (!) 156.3 kg (344 lb 9.3 oz)   SpO2 96%   BMI 50.89 kg/m²     Objective:   Physical Exam  Vitals and nursing note reviewed.   Constitutional:       General: He is not in acute distress.     Appearance: He is well-developed. He is obese. He is not diaphoretic.   HENT:      Head: Normocephalic and atraumatic.      Nose: Nose normal.   Eyes:      General: No scleral icterus.     Conjunctiva/sclera: Conjunctivae normal.   Neck:      Thyroid: No thyromegaly.      Vascular: No JVD.   Cardiovascular:      Rate and Rhythm: Normal rate and regular rhythm.      Heart sounds: S1 normal and S2 normal. Murmur heard.      No friction rub. No gallop. No S3 or S4 sounds.   Pulmonary:      Effort: Pulmonary effort is normal. No respiratory distress.      Breath sounds: Normal breath sounds. No stridor. No wheezing or rales.   Chest:      Chest wall: No tenderness.   Abdominal:      General: Bowel sounds are normal. There is no distension.      Palpations: Abdomen is soft. There is no mass.      Tenderness: There is no abdominal tenderness. There is no rebound.   Genitourinary:     Comments: Deferred  Musculoskeletal:         General: No tenderness or deformity. Normal range of motion.      Cervical back: Normal range of motion and neck supple.   Lymphadenopathy:      Cervical: No cervical adenopathy.   Skin:     General: Skin is warm and dry.      Coloration: Skin is not pale.      Findings: No erythema or rash.   Neurological:      Mental Status: He is alert and oriented to person, place, and time.      Motor: No abnormal muscle tone.      Coordination: Coordination normal.   Psychiatric:         " "Behavior: Behavior normal.         Thought Content: Thought content normal.         Judgment: Judgment normal.         Lab Results   Component Value Date     10/25/2019    K 4.1 10/25/2019     10/25/2019    CO2 22 (L) 10/25/2019    BUN 23 (H) 10/25/2019    CREATININE 1.7 (H) 10/25/2019     (H) 10/25/2019    HGBA1C 5.0 12/10/2021    AST 18 10/25/2019    ALT 19 10/25/2019    ALBUMIN 3.6 10/25/2019    PROT 8.3 10/25/2019    BILITOT 1.4 (H) 10/25/2019    WBC 15.29 (H) 10/25/2019    HGB 12.4 (L) 10/25/2019    HCT 38.6 (L) 10/25/2019    MCV 83 10/25/2019     10/25/2019    TSH 1.87 12/10/2021         No results found for: "BNP", "INR"       Assessment:      1. Class 3 congestive heart failure, acute on chronic, combined    2. Essential hypertension    3. Cardiomegaly    4. Chronic congestive heart failure, unspecified heart failure type    5. BMI 50.0-59.9, adult    6. Former tobacco use        Plan:     CHF, cardiomegaly - last EF told 25 %  - titrate meds -   - cont Entresto - increase dose, lasix  - add Coreg  - order ECHO, will discuss ischemic workup  - order labs     2. HTN - uncontrolled   - titrate meds   - cont low salt diet    3. Obesity  - cont weight loss     4. H/o Tobacco use  - cont cessation     Visit today included increased complexity associated with the care of the episodic problem HTN addressed and managing the longitudinal care of the patient due to the serious and/or complex managed problem(s)       Thank you for allowing me to participate in this patient's care. Please do not hesitate to contact me with any questions or concerns. Consult note has been forwarded to the referral physician.     "

## 2024-05-16 ENCOUNTER — TELEPHONE (OUTPATIENT)
Dept: CARDIOLOGY | Facility: CLINIC | Age: 34
End: 2024-05-16
Payer: COMMERCIAL

## 2024-05-16 ENCOUNTER — PATIENT MESSAGE (OUTPATIENT)
Dept: CARDIOLOGY | Facility: CLINIC | Age: 34
End: 2024-05-16
Payer: COMMERCIAL

## 2024-05-16 DIAGNOSIS — I50.9 CHRONIC CONGESTIVE HEART FAILURE, UNSPECIFIED HEART FAILURE TYPE: ICD-10-CM

## 2024-05-16 LAB
OHS QRS DURATION: 88 MS
OHS QTC CALCULATION: 474 MS

## 2024-05-16 RX ORDER — FUROSEMIDE 40 MG/1
40 TABLET ORAL DAILY
Qty: 90 TABLET | Refills: 1 | Status: SHIPPED | OUTPATIENT
Start: 2024-05-16

## 2024-05-16 NOTE — TELEPHONE ENCOUNTER
Attempted to contact pt regarding lab results. No answer; pt doesn't have an VM. Results sent through portal.     ----- Message from Calin Henry MD sent at 5/16/2024  8:10 AM CDT -----  Please contact the patient and let them know that their results of labs reveal elevated BNP due to fluid- will increase lasix to 40 mg daily, cont rest of meds monitor BP and weights daily and continue low salt diet, follow up with me in 3-4 weeks.

## 2024-05-24 ENCOUNTER — HOSPITAL ENCOUNTER (OUTPATIENT)
Dept: CARDIOLOGY | Facility: HOSPITAL | Age: 34
Discharge: HOME | End: 2024-05-24
Attending: INTERNAL MEDICINE
Payer: COMMERCIAL

## 2024-05-24 VITALS
WEIGHT: 315 LBS | HEIGHT: 69 IN | DIASTOLIC BLOOD PRESSURE: 102 MMHG | SYSTOLIC BLOOD PRESSURE: 144 MMHG | BODY MASS INDEX: 46.65 KG/M2

## 2024-05-24 DIAGNOSIS — I51.7 CARDIOMEGALY: ICD-10-CM

## 2024-05-24 DIAGNOSIS — Z87.891 FORMER TOBACCO USE: ICD-10-CM

## 2024-05-24 DIAGNOSIS — I50.9 CHRONIC CONGESTIVE HEART FAILURE, UNSPECIFIED HEART FAILURE TYPE: ICD-10-CM

## 2024-05-24 DIAGNOSIS — I50.43 CLASS 3 CONGESTIVE HEART FAILURE, ACUTE ON CHRONIC, COMBINED: ICD-10-CM

## 2024-05-24 DIAGNOSIS — I10 ESSENTIAL HYPERTENSION: ICD-10-CM

## 2024-05-24 LAB
AORTIC ROOT ANNULUS: 2.95 CM
AV INDEX (PROSTH): 0.74
AV MEAN GRADIENT: 2 MMHG
AV PEAK GRADIENT: 4 MMHG
AV VALVE AREA BY VELOCITY RATIO: 2.52 CM²
AV VALVE AREA: 2.42 CM²
AV VELOCITY RATIO: 0.77
BSA FOR ECHO PROCEDURE: 2.76 M2
CV ECHO LV RWT: 0.43 CM
DOP CALC AO PEAK VEL: 0.96 M/S
DOP CALC AO VTI: 13.9 CM
DOP CALC LVOT AREA: 3.3 CM2
DOP CALC LVOT DIAMETER: 2.04 CM
DOP CALC LVOT PEAK VEL: 0.74 M/S
DOP CALC LVOT STROKE VOLUME: 33.65 CM3
DOP CALC RVOT PEAK VEL: 0.56 M/S
DOP CALC RVOT VTI: 8.5 CM
DOP CALCLVOT PEAK VEL VTI: 10.3 CM
ECHO LV POSTERIOR WALL: 1.54 CM (ref 0.6–1.1)
EJECTION FRACTION: 33 %
FRACTIONAL SHORTENING: 13 % (ref 28–44)
INTERVENTRICULAR SEPTUM: 1.06 CM (ref 0.6–1.1)
IVC DIAMETER: 2.5 CM
LA MAJOR: 6.05 CM
LA MINOR: 5.77 CM
LA WIDTH: 4.4 CM
LEFT ATRIUM SIZE: 5.11 CM
LEFT ATRIUM VOLUME INDEX MOD: 41.1 ML/M2
LEFT ATRIUM VOLUME INDEX: 43.4 ML/M2
LEFT ATRIUM VOLUME MOD: 106.73 CM3
LEFT ATRIUM VOLUME: 112.88 CM3
LEFT INTERNAL DIMENSION IN SYSTOLE: 6.14 CM (ref 2.1–4)
LEFT VENTRICLE DIASTOLIC VOLUME INDEX: 101.17 ML/M2
LEFT VENTRICLE DIASTOLIC VOLUME: 263.04 ML
LEFT VENTRICLE MASS INDEX: 177 G/M2
LEFT VENTRICLE SYSTOLIC VOLUME INDEX: 73 ML/M2
LEFT VENTRICLE SYSTOLIC VOLUME: 189.87 ML
LEFT VENTRICULAR INTERNAL DIMENSION IN DIASTOLE: 7.09 CM (ref 3.5–6)
LEFT VENTRICULAR MASS: 461.07 G
LVOT MG: 1.31 MMHG
LVOT MV: 0.54 CM/S
OHS CV RV/LV RATIO: 0.45 CM
OHS LV EJECTION FRACTION SIMPSONS BIPLANE MOD: 34 %
PISA TR MAX VEL: 3.42 M/S
PV MEAN GRADIENT: 1 MMHG
PV MV: 0.5 M/S
PV PEAK GRADIENT: 2 MMHG
PV PEAK VELOCITY: 0.76 M/S
RA MAJOR: 4.75 CM
RA PRESSURE ESTIMATED: 8 MMHG
RA WIDTH: 3.5 CM
RIGHT VENTRICULAR END-DIASTOLIC DIMENSION: 3.2 CM
RV TB RVSP: 11 MMHG
SINUS: 2.78 CM
STJ: 2.08 CM
TR MAX PG: 47 MMHG
TV REST PULMONARY ARTERY PRESSURE: 55 MMHG
Z-SCORE OF LEFT VENTRICULAR DIMENSION IN END DIASTOLE: -9.57
Z-SCORE OF LEFT VENTRICULAR DIMENSION IN END SYSTOLE: -4.49

## 2024-05-24 PROCEDURE — 93306 TTE W/DOPPLER COMPLETE: CPT | Mod: 26,,, | Performed by: INTERNAL MEDICINE

## 2024-05-24 PROCEDURE — 93306 TTE W/DOPPLER COMPLETE: CPT

## 2024-05-28 ENCOUNTER — OFFICE VISIT (OUTPATIENT)
Dept: PRIMARY CARE CLINIC | Facility: CLINIC | Age: 34
End: 2024-05-28
Payer: COMMERCIAL

## 2024-05-28 VITALS
BODY MASS INDEX: 46.65 KG/M2 | HEIGHT: 69 IN | SYSTOLIC BLOOD PRESSURE: 110 MMHG | TEMPERATURE: 97 F | WEIGHT: 315 LBS | HEART RATE: 108 BPM | OXYGEN SATURATION: 97 % | DIASTOLIC BLOOD PRESSURE: 82 MMHG

## 2024-05-28 DIAGNOSIS — Z87.891 FORMER TOBACCO USE: ICD-10-CM

## 2024-05-28 DIAGNOSIS — I10 ESSENTIAL HYPERTENSION: ICD-10-CM

## 2024-05-28 DIAGNOSIS — I51.7 CONCENTRIC LEFT VENTRICULAR HYPERTROPHY: Primary | ICD-10-CM

## 2024-05-28 DIAGNOSIS — I11.0 SYSTOLIC HEART FAILURE SECONDARY TO HYPERTENSION: ICD-10-CM

## 2024-05-28 DIAGNOSIS — N18.2 CKD (CHRONIC KIDNEY DISEASE) STAGE 2, GFR 60-89 ML/MIN: ICD-10-CM

## 2024-05-28 DIAGNOSIS — I50.20 SYSTOLIC HEART FAILURE SECONDARY TO HYPERTENSION: ICD-10-CM

## 2024-05-28 DIAGNOSIS — E78.00 PURE HYPERCHOLESTEROLEMIA: ICD-10-CM

## 2024-05-28 PROCEDURE — 4010F ACE/ARB THERAPY RXD/TAKEN: CPT | Mod: CPTII,S$GLB,, | Performed by: INTERNAL MEDICINE

## 2024-05-28 PROCEDURE — 3066F NEPHROPATHY DOC TX: CPT | Mod: CPTII,S$GLB,, | Performed by: INTERNAL MEDICINE

## 2024-05-28 PROCEDURE — G2211 COMPLEX E/M VISIT ADD ON: HCPCS | Mod: S$GLB,,, | Performed by: INTERNAL MEDICINE

## 2024-05-28 PROCEDURE — 99215 OFFICE O/P EST HI 40 MIN: CPT | Mod: S$GLB,,, | Performed by: INTERNAL MEDICINE

## 2024-05-28 PROCEDURE — 3079F DIAST BP 80-89 MM HG: CPT | Mod: CPTII,S$GLB,, | Performed by: INTERNAL MEDICINE

## 2024-05-28 PROCEDURE — 99999 PR PBB SHADOW E&M-EST. PATIENT-LVL IV: CPT | Mod: PBBFAC,,, | Performed by: INTERNAL MEDICINE

## 2024-05-28 PROCEDURE — 3008F BODY MASS INDEX DOCD: CPT | Mod: CPTII,S$GLB,, | Performed by: INTERNAL MEDICINE

## 2024-05-28 PROCEDURE — 3074F SYST BP LT 130 MM HG: CPT | Mod: CPTII,S$GLB,, | Performed by: INTERNAL MEDICINE

## 2024-05-28 PROCEDURE — 1159F MED LIST DOCD IN RCRD: CPT | Mod: CPTII,S$GLB,, | Performed by: INTERNAL MEDICINE

## 2024-05-28 PROCEDURE — 3044F HG A1C LEVEL LT 7.0%: CPT | Mod: CPTII,S$GLB,, | Performed by: INTERNAL MEDICINE

## 2024-05-28 PROCEDURE — 3060F POS MICROALBUMINURIA REV: CPT | Mod: CPTII,S$GLB,, | Performed by: INTERNAL MEDICINE

## 2024-05-28 RX ORDER — ASPIRIN 81 MG/1
TABLET ORAL
Qty: 90 TABLET | Refills: 3 | Status: SHIPPED | OUTPATIENT
Start: 2024-05-28

## 2024-05-28 RX ORDER — ATORVASTATIN CALCIUM 40 MG/1
TABLET, FILM COATED ORAL
Qty: 90 TABLET | Refills: 3 | Status: SHIPPED | OUTPATIENT
Start: 2024-05-28

## 2024-05-28 NOTE — PROGRESS NOTES
Subjective     Patient ID: Jorge Zuñiga is a 33 y.o. male.    Chief Complaint: Hypertension (Two week follow up)      HPI  f/u HTN.  Better.  D/w pt in detail.  Feels less sob.  All labs and echo d/w pt.  H as upcoming f/u with Dr. Henry/toshia.  We discussed in detail LSM.  Taking medications as prescribed.    Recent Results (from the past 504 hour(s))   SCHEDULED EKG 12-LEAD (to Muse)    Collection Time: 05/15/24 11:29 AM   Result Value Ref Range    QRS Duration 88 ms    [facility] QTC Calculation 474 ms   Comprehensive Metabolic Panel    Collection Time: 05/15/24 12:47 PM   Result Value Ref Range    Sodium 142 136 - 145 mmol/L    Potassium 4.8 3.5 - 5.1 mmol/L    Chloride 105 95 - 110 mmol/L    CO2 30 (H) 23 - 29 mmol/L    Glucose 97 70 - 110 mg/dL    BUN 13 6 - 20 mg/dL    Creatinine 1.2 0.5 - 1.4 mg/dL    Calcium 9.1 8.7 - 10.5 mg/dL    Total Protein 6.8 6.0 - 8.4 g/dL    Albumin 3.6 3.5 - 5.2 g/dL    Total Bilirubin 0.7 0.1 - 1.0 mg/dL    Alkaline Phosphatase 63 55 - 135 U/L    AST 20 10 - 40 U/L    ALT 17 10 - 44 U/L    eGFR >60 >60 mL/min/1.73 m^2    Anion Gap 7 (L) 8 - 16 mmol/L   Magnesium    Collection Time: 05/15/24 12:47 PM   Result Value Ref Range    Magnesium 2.3 1.6 - 2.6 mg/dL   BNP    Collection Time: 05/15/24 12:47 PM   Result Value Ref Range    BNP 1,488 (H) 0 - 99 pg/mL   CBC Auto Differential    Collection Time: 05/15/24 12:47 PM   Result Value Ref Range    WBC 10.64 3.90 - 12.70 K/uL    RBC 5.52 4.60 - 6.20 M/uL    Hemoglobin 15.0 14.0 - 18.0 g/dL    Hematocrit 47.7 40.0 - 54.0 %    MCV 86 82 - 98 fL    MCH 27.2 27.0 - 31.0 pg    MCHC 31.4 (L) 32.0 - 36.0 g/dL    RDW 13.3 11.5 - 14.5 %    Platelets 356 150 - 450 K/uL    MPV 9.6 9.2 - 12.9 fL    Immature Granulocytes 0.2 0.0 - 0.5 %    Gran # (ANC) 6.5 1.8 - 7.7 K/uL    Immature Grans (Abs) 0.02 0.00 - 0.04 K/uL    Lymph # 3.3 1.0 - 4.8 K/uL    Mono # 0.7 0.3 - 1.0 K/uL    Eos # 0.1 0.0 - 0.5 K/uL    Baso # 0.05 0.00 - 0.20 K/uL    nRBC 0 0  /100 WBC    Gran % 60.8 38.0 - 73.0 %    Lymph % 30.9 18.0 - 48.0 %    Mono % 6.3 4.0 - 15.0 %    Eosinophil % 1.3 0.0 - 8.0 %    Basophil % 0.5 0.0 - 1.9 %    Differential Method Automated    Lipid Panel    Collection Time: 05/24/24  7:39 AM   Result Value Ref Range    Cholesterol 215 (H) 120 - 199 mg/dL    Triglycerides 97 30 - 150 mg/dL    HDL 49 40 - 75 mg/dL    LDL Cholesterol 146.6 63.0 - 159.0 mg/dL    HDL/Cholesterol Ratio 22.8 20.0 - 50.0 %    Total Cholesterol/HDL Ratio 4.4 2.0 - 5.0    Non-HDL Cholesterol 166 mg/dL   Hemoglobin A1C    Collection Time: 05/24/24  7:39 AM   Result Value Ref Range    Hemoglobin A1C 5.3 4.0 - 5.6 %    Estimated Avg Glucose 105 68 - 131 mg/dL   TSH    Collection Time: 05/24/24  7:39 AM   Result Value Ref Range    TSH 0.915 0.400 - 4.000 uIU/mL   Comprehensive Metabolic Panel    Collection Time: 05/24/24  7:39 AM   Result Value Ref Range    Sodium 142 136 - 145 mmol/L    Potassium 4.8 3.5 - 5.1 mmol/L    Chloride 107 95 - 110 mmol/L    CO2 24 23 - 29 mmol/L    Glucose 94 70 - 110 mg/dL    BUN 17 6 - 20 mg/dL    Creatinine 1.3 0.5 - 1.4 mg/dL    Calcium 9.5 8.7 - 10.5 mg/dL    Total Protein 7.3 6.0 - 8.4 g/dL    Albumin 3.8 3.5 - 5.2 g/dL    Total Bilirubin 0.8 0.1 - 1.0 mg/dL    Alkaline Phosphatase 63 55 - 135 U/L    AST 20 10 - 40 U/L    ALT 15 10 - 44 U/L    eGFR >60.0 >60 mL/min/1.73 m^2    Anion Gap 11 8 - 16 mmol/L   CBC Auto Differential    Collection Time: 05/24/24  7:39 AM   Result Value Ref Range    WBC 9.00 3.90 - 12.70 K/uL    RBC 5.65 4.60 - 6.20 M/uL    Hemoglobin 15.3 14.0 - 18.0 g/dL    Hematocrit 50.1 40.0 - 54.0 %    MCV 89 82 - 98 fL    MCH 27.1 27.0 - 31.0 pg    MCHC 30.5 (L) 32.0 - 36.0 g/dL    RDW 13.2 11.5 - 14.5 %    Platelets 316 150 - 450 K/uL    MPV 10.9 9.2 - 12.9 fL    Immature Granulocytes 0.2 0.0 - 0.5 %    Gran # (ANC) 5.7 1.8 - 7.7 K/uL    Immature Grans (Abs) 0.02 0.00 - 0.04 K/uL    Lymph # 2.6 1.0 - 4.8 K/uL    Mono # 0.6 0.3 - 1.0 K/uL    Eos  # 0.1 0.0 - 0.5 K/uL    Baso # 0.04 0.00 - 0.20 K/uL    nRBC 0 0 /100 WBC    Gran % 63.5 38.0 - 73.0 %    Lymph % 28.7 18.0 - 48.0 %    Mono % 6.2 4.0 - 15.0 %    Eosinophil % 1.0 0.0 - 8.0 %    Basophil % 0.4 0.0 - 1.9 %    Differential Method Automated    Microalbumin/Creatinine Ratio, Urine    Collection Time: 05/24/24  7:39 AM   Result Value Ref Range    Microalbumin, Urine 99.0 ug/mL    Creatinine, Urine 198.0 23.0 - 375.0 mg/dL    Microalb/Creat Ratio 50.0 (H) 0.0 - 30.0 ug/mg   Urinalysis    Collection Time: 05/24/24  7:39 AM   Result Value Ref Range    Specimen UA Urine, Clean Catch     Color, UA Yellow Yellow, Straw, Mita    Appearance, UA Clear Clear    pH, UA 6.0 5.0 - 8.0    Specific Gravity, UA 1.025 1.005 - 1.030    Protein, UA Trace (A) Negative    Glucose, UA Negative Negative    Ketones, UA Negative Negative    Bilirubin (UA) Negative Negative    Occult Blood UA Negative Negative    Nitrite, UA Negative Negative    Leukocytes, UA Negative Negative   Echo    Collection Time: 05/24/24 10:12 AM   Result Value Ref Range    RA Width 3.50 cm    LA volume (mod) 106.73 cm3    Left Atrium Major Axis 6.05 cm    Left Atrium Minor Axis 5.77 cm    RA Major Axis 4.75 cm    LV Diastolic Volume 263.04 mL    LV Systolic Volume 189.87 mL    TR Max Antolin 3.42 m/s    PV mean gradient 1 mmHg    RVOT peak VTI 8.5 cm    RVOT peak antolin 0.56 m/s    Ao VTI 13.90 cm    Ao peak antolin 0.96 m/s    LVOT peak VTI 10.30 cm    LVOT peak antolin 0.74 m/s    LVOT diameter 2.04 cm    PV peak gradient 2 mmHg    AV mean gradient 2 mmHg    RVDD 3.20 cm    LA size 5.11 cm    STJ 2.08 cm    Sinus 2.78 cm    LVIDs 6.14 (A) 2.1 - 4.0 cm    Ao root annulus 2.95 cm    Posterior Wall 1.54 (A) 0.6 - 1.1 cm    IVS 1.06 0.6 - 1.1 cm    LVIDd 7.09 (A) 3.5 - 6.0 cm    PV PEAK VELOCITY 0.76 m/s    Pulmonary Valve Mean Velocity 0.50 m/s    Left Ventricular Outflow Tract Mean Gradient 1.31 mmHg    Left Ventricular Outflow Tract Mean Velocity 0.54 cm/s     "Lockwood's Biplane MOD Ejection Fraction 34 %    IVC diameter 2.50 cm    RV/LV Ratio 0.45 cm    FS 13 28 - 44 %    LV mass 461.07 g    Left Ventricle Relative Wall Thickness 0.43 cm    AV valve area 2.42 cm²    AV Velocity Ratio 0.77     AV index (prosthetic) 0.74     LVOT area 3.3 cm2    LVOT stroke volume 33.65 cm3    AV peak gradient 4 mmHg    Triscuspid Valve Regurgitation Peak Gradient 47 mmHg    ALYSSA by Velocity Ratio 2.52 cm²    BSA 2.76 m2    LV Systolic Volume Index 73.0 mL/m2    LV Diastolic Volume Index 101.17 mL/m2    LV Mass Index 177 g/m2    LA Volume Index (Mod) 41.1 mL/m2    ZLVIDS -4.49     ZLVIDD -9.57     LA Volume Index 43.4 mL/m2    LA volume 112.88 cm3    LA WIDTH 4.4 cm    TV resting pulmonary artery pressure 55 mmHg    RV TB RVSP 11 mmHg    Est. RA pres 8 mmHg    EF 33 %   ]  Past Medical History:   Diagnosis Date    BMI 50.0-59.9, adult 05/13/2024    CKD (chronic kidney disease) stage 2, GFR 60-89 ml/min 05/28/2024    Concentric left ventricular hypertrophy 05/28/2024    Essential hypertension 05/13/2024    Former tobacco use 05/13/2024    Pure hypercholesterolemia 05/28/2019    Systolic heart failure secondary to hypertension 05/28/2024     Review of patient's allergies indicates:  No Known Allergies  No past surgical history on file.  No family history on file.  Social History     Socioeconomic History    Marital status:    Tobacco Use    Smoking status: Former     Types: Cigarettes     Passive exposure: Past    Smokeless tobacco: Never   Substance and Sexual Activity    Alcohol use: Yes    Drug use: Never    Sexual activity: Yes     Partners: Female         /82   Pulse 108   Temp 97 °F (36.1 °C) (Tympanic)   Ht 5' 9" (1.753 m)   Wt (!) 156.3 kg (344 lb 9.3 oz)   SpO2 97%   BMI 50.89 kg/m²   Outpatient Medications as of 5/28/2024   Medication Sig Dispense Refill    albuterol (PROVENTIL/VENTOLIN HFA) 90 mcg/actuation inhaler Inhale 2 puffs into the lungs every 4 (four) " hours as needed.      azelastine (ASTELIN) 137 mcg (0.1 %) nasal spray 2 sprays by Nasal route.      carvediloL (COREG) 6.25 MG tablet Take 1 tablet (6.25 mg total) by mouth 2 (two) times daily with meals. 60 tablet 3    fluticasone propionate (FLONASE) 50 mcg/actuation nasal spray 2 sprays by Nasal route.      furosemide (LASIX) 40 MG tablet Take 1 tablet (40 mg total) by mouth once daily. 90 tablet 1    sacubitriL-valsartan (ENTRESTO) 49-51 mg per tablet Take 1 tablet by mouth 2 (two) times daily. 60 tablet 3     No current facility-administered medications on file as of 5/28/2024.       Review of Systems   All other systems reviewed and are negative.         Objective     Physical Exam  Constitutional:       General: He is not in acute distress.     Appearance: Normal appearance. He is obese. He is not ill-appearing, toxic-appearing or diaphoretic.   HENT:      Head: Normocephalic and atraumatic.      Mouth/Throat:      Mouth: Mucous membranes are moist.   Eyes:      Conjunctiva/sclera: Conjunctivae normal.   Cardiovascular:      Rate and Rhythm: Normal rate and regular rhythm.      Heart sounds: Murmur heard.      No friction rub. No gallop.   Pulmonary:      Effort: Pulmonary effort is normal. No respiratory distress.      Breath sounds: Normal breath sounds. No wheezing or rales.   Musculoskeletal:      Cervical back: Neck supple.   Skin:     General: Skin is dry.   Neurological:      General: No focal deficit present.      Mental Status: He is alert and oriented to person, place, and time.   Psychiatric:         Mood and Affect: Mood normal.         Behavior: Behavior normal.            Assessment and Plan     1. Concentric left ventricular hypertrophy  -     aspirin (ECOTRIN) 81 MG EC tablet; Take 1 tablet daily for heart health  Dispense: 90 tablet; Refill: 3    2. Systolic heart failure secondary to hypertension  -     aspirin (ECOTRIN) 81 MG EC tablet; Take 1 tablet daily for heart health  Dispense: 90  tablet; Refill: 3    3. BMI 50.0-59.9, adult  Comments:  weight loss encouraged    4. Essential hypertension  Comments:  pt has started recording his bp and making some improvements    5. Pure hypercholesterolemia  Comments:  started Lipitor May 2024; LSM  Orders:  -     atorvastatin (LIPITOR) 40 MG tablet; Take 1 tablet daily for cholesterol and heart health  Dispense: 90 tablet; Refill: 3    6. Former tobacco use    7. CKD (chronic kidney disease) stage 2, GFR 60-89 ml/min  Comments:  needs bp control; continue Entresto; DASH diet       Encouraged compliance, meds, dash diet, LSM, BMI improvement and f/u with cards.  All questions answered.        There is no immunization history on file for this patient.    I spent a total of 40 minutes on the day of the visit.This includes face to face time and non-face to face time preparing to see the patient (eg, review of tests), obtaining and/or reviewing separately obtained history, documenting clinical information in the electronic or other health record, independently interpreting results and communicating results to the patient/family/caregiver, or care coordinator.    Visit today included increased complexity associated with the care of the episodic problem systolic heart failure addressed and managing the longitudinal care of the patient due to the serious and/or complex managed problem(s) systolic heart failure and CKD.

## 2024-05-29 ENCOUNTER — TELEPHONE (OUTPATIENT)
Dept: CARDIOLOGY | Facility: CLINIC | Age: 34
End: 2024-05-29
Payer: COMMERCIAL

## 2024-05-29 NOTE — TELEPHONE ENCOUNTER
Pt was contacted about results:     Echo showed EF 35% moderately decreased   Continue current Rx.   F/U as scheduled       Pt verbalized understanding with no questions or concerns.        ----- Message from Fabián Morales MD sent at 5/29/2024  1:11 PM CDT -----  Echo showed EF 35% moderately decreased  Continue current Rx.   F/U as scheduled

## 2024-06-25 ENCOUNTER — OFFICE VISIT (OUTPATIENT)
Dept: CARDIOLOGY | Facility: CLINIC | Age: 34
End: 2024-06-25
Payer: COMMERCIAL

## 2024-06-25 VITALS
HEART RATE: 111 BPM | HEIGHT: 69 IN | BODY MASS INDEX: 46.65 KG/M2 | SYSTOLIC BLOOD PRESSURE: 158 MMHG | OXYGEN SATURATION: 97 % | WEIGHT: 315 LBS | DIASTOLIC BLOOD PRESSURE: 109 MMHG

## 2024-06-25 DIAGNOSIS — I50.810 RVF (RIGHT VENTRICULAR FAILURE): ICD-10-CM

## 2024-06-25 DIAGNOSIS — Z87.891 FORMER TOBACCO USE: ICD-10-CM

## 2024-06-25 DIAGNOSIS — G47.30 SLEEP APNEA, UNSPECIFIED TYPE: ICD-10-CM

## 2024-06-25 DIAGNOSIS — I27.20 PULMONARY HYPERTENSION: ICD-10-CM

## 2024-06-25 DIAGNOSIS — I50.9 CHRONIC CONGESTIVE HEART FAILURE, UNSPECIFIED HEART FAILURE TYPE: ICD-10-CM

## 2024-06-25 DIAGNOSIS — I50.41 ACUTE COMBINED SYSTOLIC AND DIASTOLIC CHF, NYHA CLASS 2: Primary | ICD-10-CM

## 2024-06-25 DIAGNOSIS — I10 ESSENTIAL HYPERTENSION: ICD-10-CM

## 2024-06-25 DIAGNOSIS — E78.00 PURE HYPERCHOLESTEROLEMIA: ICD-10-CM

## 2024-06-25 DIAGNOSIS — I51.7 CARDIOMEGALY: ICD-10-CM

## 2024-06-25 PROCEDURE — 1159F MED LIST DOCD IN RCRD: CPT | Mod: CPTII,S$GLB,, | Performed by: INTERNAL MEDICINE

## 2024-06-25 PROCEDURE — 3077F SYST BP >= 140 MM HG: CPT | Mod: CPTII,S$GLB,, | Performed by: INTERNAL MEDICINE

## 2024-06-25 PROCEDURE — 3008F BODY MASS INDEX DOCD: CPT | Mod: CPTII,S$GLB,, | Performed by: INTERNAL MEDICINE

## 2024-06-25 PROCEDURE — 3066F NEPHROPATHY DOC TX: CPT | Mod: CPTII,S$GLB,, | Performed by: INTERNAL MEDICINE

## 2024-06-25 PROCEDURE — 99214 OFFICE O/P EST MOD 30 MIN: CPT | Mod: S$GLB,,, | Performed by: INTERNAL MEDICINE

## 2024-06-25 PROCEDURE — 3044F HG A1C LEVEL LT 7.0%: CPT | Mod: CPTII,S$GLB,, | Performed by: INTERNAL MEDICINE

## 2024-06-25 PROCEDURE — 3060F POS MICROALBUMINURIA REV: CPT | Mod: CPTII,S$GLB,, | Performed by: INTERNAL MEDICINE

## 2024-06-25 PROCEDURE — G2211 COMPLEX E/M VISIT ADD ON: HCPCS | Mod: S$GLB,,, | Performed by: INTERNAL MEDICINE

## 2024-06-25 PROCEDURE — 3080F DIAST BP >= 90 MM HG: CPT | Mod: CPTII,S$GLB,, | Performed by: INTERNAL MEDICINE

## 2024-06-25 PROCEDURE — 4010F ACE/ARB THERAPY RXD/TAKEN: CPT | Mod: CPTII,S$GLB,, | Performed by: INTERNAL MEDICINE

## 2024-06-25 PROCEDURE — 99999 PR PBB SHADOW E&M-EST. PATIENT-LVL IV: CPT | Mod: PBBFAC,,, | Performed by: INTERNAL MEDICINE

## 2024-06-25 PROCEDURE — 1160F RVW MEDS BY RX/DR IN RCRD: CPT | Mod: CPTII,S$GLB,, | Performed by: INTERNAL MEDICINE

## 2024-06-25 RX ORDER — FUROSEMIDE 40 MG/1
40 TABLET ORAL DAILY
Qty: 90 TABLET | Refills: 1 | Status: SHIPPED | OUTPATIENT
Start: 2024-06-25

## 2024-06-25 RX ORDER — SACUBITRIL AND VALSARTAN 97; 103 MG/1; MG/1
1 TABLET, FILM COATED ORAL 2 TIMES DAILY
Qty: 60 TABLET | Refills: 3 | Status: SHIPPED | OUTPATIENT
Start: 2024-06-25

## 2024-06-25 RX ORDER — CARVEDILOL 12.5 MG/1
12.5 TABLET ORAL 2 TIMES DAILY WITH MEALS
Qty: 60 TABLET | Refills: 3 | Status: SHIPPED | OUTPATIENT
Start: 2024-06-25 | End: 2025-06-25

## 2024-06-25 NOTE — PROGRESS NOTES
Subjective:   Patient ID:  Jorge Zuñiga is a 33 y.o. male who presents for cardiac consult of No chief complaint on file.      Referral by: No referring provider defined for this encounter.     Reason for consult: CHF      HPI  The patient came in today for cardiac consult of No chief complaint on file.      Jorge Zuñiga is a 33 y.o. male pt with CHF EF 30-35%, RV dysfunction, pulm HTN, HTN, tobacco use, obesity presents for CVD eval.     Per PCP  HPI here to establish care.  Longstanding hypertension not controlled.  He has a former patient of an outside facility.  Apparently he was diagnosed with a cardiomyopathy and was put on Entresto through the outside cardiologist.  However, it was extremely expensive and he was unable to continue to afford this.  He was also supposed to be scheduled for a left and right heart catheterization but because he was told his from payment was nearly a 1000 dollars, he did not proceed with it because he could not afford that at that time.  We talked extensively about preventative health care and low-salt diet as well as heart health.  He does not have any acute concerns.  Does note that when he does eat higher salt he feels more comfortable sleeping in a recliner but this has been longstanding for the last few years.     5/15/24  BP today elevated 144/102, . BMI 50 - 344 lbs  Since last year he has been seen Dr. Paul Holt - [facility] - was told to get R/[facility].   He had SOB last year appx and stopped smoking and improved but the end of year started having SOB and more edema.   He now has issues with laying flat as well  He has started Entersto and was told $700   ECG - sinus tach, Vrate 110, LAE, LVH    24  ECHO 2024 with severely dilated, LVEF 30-35%, RV mod decreased function, enlarge, mild to mod MR, mild TR, int CVP, PASP 55mmHg.   BP elevated 158/109. . BMI 50       FH - father -  younger    Results for orders placed during the hospital  encounter of 05/24/24    Echo    Interpretation Summary    Left Ventricle: The left ventricle is severely dilated. Moderately increased ventricular mass. Mildly increased wall thickness. There is concentric hypertrophy. Severe global hypokinesis present. There is moderately reduced systolic function with a visually estimated ejection fraction of 30 - 35%. Ejection fraction by visual approximation is 33%. Biplane (2D) method of discs ejection fraction is 34%.    Right Ventricle: Right ventricular enlargement. Wall thickness is normal. Systolic function is moderately reduced.    Left Atrium: Left atrium is moderately dilated.    Right Atrium: Right atrium is dilated.    Mitral Valve: There is mild to moderate regurgitation.    Tricuspid Valve: There is mild regurgitation.    Pulmonary Artery: There is moderate pulmonary hypertension. The estimated pulmonary artery systolic pressure is 55 mmHg.    IVC/SVC: Intermediate venous pressure at 8 mmHg.      No cardiac monitor results found for the past 12 months         Past Medical History:   Diagnosis Date    BMI 50.0-59.9, adult 05/13/2024    CKD (chronic kidney disease) stage 2, GFR 60-89 ml/min 05/28/2024    Concentric left ventricular hypertrophy 05/28/2024    Essential hypertension 05/13/2024    Former tobacco use 05/13/2024    Pure hypercholesterolemia 05/28/2019    Systolic heart failure secondary to hypertension 05/28/2024       History reviewed. No pertinent surgical history.    Social History     Tobacco Use    Smoking status: Former     Types: Cigarettes     Passive exposure: Past    Smokeless tobacco: Never   Substance Use Topics    Alcohol use: Yes    Drug use: Never       No family history on file.    Patient's Medications   New Prescriptions    SACUBITRIL-VALSARTAN (ENTRESTO)  MG PER TABLET    Take 1 tablet by mouth 2 (two) times daily.   Previous Medications    ALBUTEROL (PROVENTIL/VENTOLIN HFA) 90 MCG/ACTUATION INHALER    Inhale 2 puffs into the lungs  every 4 (four) hours as needed.    ASPIRIN (ECOTRIN) 81 MG EC TABLET    Take 1 tablet daily for heart health    ATORVASTATIN (LIPITOR) 40 MG TABLET    Take 1 tablet daily for cholesterol and heart health    AZELASTINE (ASTELIN) 137 MCG (0.1 %) NASAL SPRAY    2 sprays by Nasal route.    FLUTICASONE PROPIONATE (FLONASE) 50 MCG/ACTUATION NASAL SPRAY    2 sprays by Nasal route.   Modified Medications    Modified Medication Previous Medication    CARVEDILOL (COREG) 12.5 MG TABLET carvediloL (COREG) 6.25 MG tablet       Take 1 tablet (12.5 mg total) by mouth 2 (two) times daily with meals.    Take 1 tablet (6.25 mg total) by mouth 2 (two) times daily with meals.    FUROSEMIDE (LASIX) 40 MG TABLET furosemide (LASIX) 40 MG tablet       Take 1 tablet (40 mg total) by mouth once daily. Take twice a day for next 5 days    Take 1 tablet (40 mg total) by mouth once daily.   Discontinued Medications    SACUBITRIL-VALSARTAN (ENTRESTO) 49-51 MG PER TABLET    Take 1 tablet by mouth 2 (two) times daily.       Review of Systems   Constitutional:  Positive for malaise/fatigue.   HENT: Negative.     Eyes: Negative.    Respiratory:  Positive for shortness of breath.    Cardiovascular:  Positive for orthopnea and leg swelling.   Gastrointestinal: Negative.    Genitourinary: Negative.    Musculoskeletal: Negative.    Skin: Negative.    Neurological: Negative.    Endo/Heme/Allergies: Negative.    Psychiatric/Behavioral: Negative.     All 12 systems otherwise negative.      Wt Readings from Last 3 Encounters:   06/25/24 (!) 155.4 kg (342 lb 9.5 oz)   05/28/24 (!) 156.3 kg (344 lb 9.3 oz)   05/24/24 (!) 156 kg (344 lb)     Temp Readings from Last 3 Encounters:   05/28/24 97 °F (36.1 °C) (Tympanic)   05/10/24 97 °F (36.1 °C) (Tympanic)   10/25/19 98 °F (36.7 °C) (Oral)     BP Readings from Last 3 Encounters:   06/25/24 (!) 158/109   05/28/24 110/82   05/24/24 (!) 144/102     Pulse Readings from Last 3 Encounters:   06/25/24 (!) 111   05/28/24  "108   05/15/24 (!) 116       BP (!) 158/109 (BP Location: Right arm, Patient Position: Sitting, BP Method: Large (Automatic))   Pulse (!) 111   Ht 5' 9" (1.753 m)   Wt (!) 155.4 kg (342 lb 9.5 oz)   SpO2 97%   BMI 50.59 kg/m²     Objective:   Physical Exam  Vitals and nursing note reviewed.   Constitutional:       General: He is not in acute distress.     Appearance: He is well-developed. He is obese. He is not diaphoretic.   HENT:      Head: Normocephalic and atraumatic.      Nose: Nose normal.   Eyes:      General: No scleral icterus.     Conjunctiva/sclera: Conjunctivae normal.   Neck:      Thyroid: No thyromegaly.      Vascular: No JVD.   Cardiovascular:      Rate and Rhythm: Normal rate and regular rhythm.      Heart sounds: S1 normal and S2 normal. Murmur heard.      No friction rub. No gallop. No S3 or S4 sounds.   Pulmonary:      Effort: Pulmonary effort is normal. No respiratory distress.      Breath sounds: Normal breath sounds. No stridor. No wheezing or rales.   Chest:      Chest wall: No tenderness.   Abdominal:      General: Bowel sounds are normal. There is no distension.      Palpations: Abdomen is soft. There is no mass.      Tenderness: There is no abdominal tenderness. There is no rebound.   Genitourinary:     Comments: Deferred  Musculoskeletal:         General: No tenderness or deformity. Normal range of motion.      Cervical back: Normal range of motion and neck supple.   Lymphadenopathy:      Cervical: No cervical adenopathy.   Skin:     General: Skin is warm and dry.      Coloration: Skin is not pale.      Findings: No erythema or rash.   Neurological:      Mental Status: He is alert and oriented to person, place, and time.      Motor: No abnormal muscle tone.      Coordination: Coordination normal.   Psychiatric:         Behavior: Behavior normal.         Thought Content: Thought content normal.         Judgment: Judgment normal.         Lab Results   Component Value Date     " 05/24/2024    K 4.8 05/24/2024     05/24/2024    CO2 24 05/24/2024    BUN 17 05/24/2024    CREATININE 1.3 05/24/2024    GLU 94 05/24/2024    HGBA1C 5.3 05/24/2024    MG 2.3 05/15/2024    AST 20 05/24/2024    ALT 15 05/24/2024    ALBUMIN 3.8 05/24/2024    PROT 7.3 05/24/2024    BILITOT 0.8 05/24/2024    WBC 9.00 05/24/2024    HGB 15.3 05/24/2024    HCT 50.1 05/24/2024    MCV 89 05/24/2024     05/24/2024    TSH 0.915 05/24/2024    CHOL 215 (H) 05/24/2024    HDL 49 05/24/2024    LDLCALC 146.6 05/24/2024    TRIG 97 05/24/2024    BNP 1,488 (H) 05/15/2024         BNP (pg/mL)   Date Value   05/15/2024 1,488 (H)          Assessment:      1. Acute combined systolic and diastolic CHF, NYHA class 2    2. Essential hypertension    3. Cardiomegaly    4. BMI 50.0-59.9, adult    5. Former tobacco use    6. RVF (right ventricular failure)    7. Pure hypercholesterolemia    8. Chronic congestive heart failure, unspecified heart failure type    9. Pulmonary hypertension    10. Sleep apnea, unspecified type          Plan:     BI V CHF EF 30-35%, RV dysfunction; pulm HTN, last BNP 1488  - titrate meds -   - cont Entresto - increase dose, --> increase Entersto to max.  lasix - increase to BID x 5 days   - increase Coreg dose   - ECHO 5/2024 with severely dilated, LVEF 30-35%, RV mod decreased function, enlarge, mild to mod MR, mild TR, int CVP, PASP 55mmHg.   - needs ischemic workup once euvolemic  - r/o FRANCESCO - ordered   - repeat labs in 1 week    2. HTN - uncontrolled   - titrate meds   - cont low salt diet    3. Obesity BMI 50 - 344 lbs -->342   - cont weight loss     4. H/o Tobacco use  - cont cessation     5. HLD  - cont statin     Visit today included increased complexity associated with the care of the episodic problem CHF addressed and managing the longitudinal care of the patient due to the serious and/or complex managed problem(s)       Thank you for allowing me to participate in this patient's care. Please do not  hesitate to contact me with any questions or concerns. Consult note has been forwarded to the referral physician.

## 2024-07-02 ENCOUNTER — LAB VISIT (OUTPATIENT)
Dept: LAB | Facility: HOSPITAL | Age: 34
End: 2024-07-02
Attending: INTERNAL MEDICINE
Payer: COMMERCIAL

## 2024-07-02 DIAGNOSIS — I50.41 ACUTE COMBINED SYSTOLIC AND DIASTOLIC CHF, NYHA CLASS 2: ICD-10-CM

## 2024-07-02 DIAGNOSIS — I50.9 CHRONIC CONGESTIVE HEART FAILURE, UNSPECIFIED HEART FAILURE TYPE: ICD-10-CM

## 2024-07-02 LAB
ANION GAP SERPL CALC-SCNC: 10 MMOL/L (ref 8–16)
BNP SERPL-MCNC: 2153 PG/ML (ref 0–99)
BUN SERPL-MCNC: 12 MG/DL (ref 6–20)
CALCIUM SERPL-MCNC: 9 MG/DL (ref 8.7–10.5)
CHLORIDE SERPL-SCNC: 108 MMOL/L (ref 95–110)
CO2 SERPL-SCNC: 26 MMOL/L (ref 23–29)
CREAT SERPL-MCNC: 1.1 MG/DL (ref 0.5–1.4)
EST. GFR  (NO RACE VARIABLE): >60 ML/MIN/1.73 M^2
GLUCOSE SERPL-MCNC: 86 MG/DL (ref 70–110)
MAGNESIUM SERPL-MCNC: 2.2 MG/DL (ref 1.6–2.6)
POTASSIUM SERPL-SCNC: 4.3 MMOL/L (ref 3.5–5.1)
SODIUM SERPL-SCNC: 144 MMOL/L (ref 136–145)

## 2024-07-02 PROCEDURE — 83880 ASSAY OF NATRIURETIC PEPTIDE: CPT | Performed by: INTERNAL MEDICINE

## 2024-07-02 PROCEDURE — 36415 COLL VENOUS BLD VENIPUNCTURE: CPT | Performed by: INTERNAL MEDICINE

## 2024-07-02 PROCEDURE — 83735 ASSAY OF MAGNESIUM: CPT | Performed by: INTERNAL MEDICINE

## 2024-07-02 PROCEDURE — 80048 BASIC METABOLIC PNL TOTAL CA: CPT | Performed by: INTERNAL MEDICINE

## 2024-07-02 RX ORDER — SPIRONOLACTONE 50 MG/1
50 TABLET, FILM COATED ORAL DAILY
Qty: 30 TABLET | Refills: 3 | Status: SHIPPED | OUTPATIENT
Start: 2024-07-02 | End: 2025-07-02

## 2024-07-02 RX ORDER — FUROSEMIDE 40 MG/1
40 TABLET ORAL 2 TIMES DAILY
Qty: 90 TABLET | Refills: 1 | Status: SHIPPED | OUTPATIENT
Start: 2024-07-02

## 2024-07-03 ENCOUNTER — TELEPHONE (OUTPATIENT)
Dept: CARDIOLOGY | Facility: CLINIC | Age: 34
End: 2024-07-03
Payer: COMMERCIAL

## 2024-07-03 NOTE — TELEPHONE ENCOUNTER
Attempted to contact patient regarding results could not lvm due to vm box not being set up.    ----- Message from Calin Henry MD sent at 7/2/2024  5:13 PM CDT -----  Please contact the patient and let them know that their results reveal worsening BNP due to fluid - will increase lasix to Lasix 40mg - two tablets twice a day for 4 days and then one tablet twice a day. And I am adding Aldactone 50mg daily. Need to have low salt diet and monitor BP and weights, follow up with me in 2-3 weeks or sooner.

## 2024-07-23 ENCOUNTER — OFFICE VISIT (OUTPATIENT)
Dept: CARDIOLOGY | Facility: CLINIC | Age: 34
End: 2024-07-23
Payer: COMMERCIAL

## 2024-07-23 ENCOUNTER — LAB VISIT (OUTPATIENT)
Dept: LAB | Facility: HOSPITAL | Age: 34
End: 2024-07-23
Attending: INTERNAL MEDICINE
Payer: COMMERCIAL

## 2024-07-23 VITALS
HEIGHT: 69 IN | OXYGEN SATURATION: 98 % | DIASTOLIC BLOOD PRESSURE: 100 MMHG | BODY MASS INDEX: 46.65 KG/M2 | WEIGHT: 315 LBS | HEART RATE: 91 BPM | SYSTOLIC BLOOD PRESSURE: 140 MMHG

## 2024-07-23 DIAGNOSIS — I50.41 ACUTE COMBINED SYSTOLIC AND DIASTOLIC CHF, NYHA CLASS 2: ICD-10-CM

## 2024-07-23 DIAGNOSIS — Z87.891 FORMER TOBACCO USE: ICD-10-CM

## 2024-07-23 DIAGNOSIS — I50.43 CLASS 3 CONGESTIVE HEART FAILURE, ACUTE ON CHRONIC, COMBINED: ICD-10-CM

## 2024-07-23 DIAGNOSIS — I50.41 ACUTE COMBINED SYSTOLIC AND DIASTOLIC CHF, NYHA CLASS 2: Primary | ICD-10-CM

## 2024-07-23 DIAGNOSIS — I50.810 RVF (RIGHT VENTRICULAR FAILURE): ICD-10-CM

## 2024-07-23 DIAGNOSIS — I10 ESSENTIAL HYPERTENSION: ICD-10-CM

## 2024-07-23 DIAGNOSIS — I27.20 PULMONARY HYPERTENSION: ICD-10-CM

## 2024-07-23 DIAGNOSIS — E78.00 PURE HYPERCHOLESTEROLEMIA: ICD-10-CM

## 2024-07-23 DIAGNOSIS — I51.7 CARDIOMEGALY: ICD-10-CM

## 2024-07-23 DIAGNOSIS — G47.33 OSA (OBSTRUCTIVE SLEEP APNEA): ICD-10-CM

## 2024-07-23 LAB
ANION GAP SERPL CALC-SCNC: 9 MMOL/L (ref 8–16)
BNP SERPL-MCNC: 835 PG/ML (ref 0–99)
BUN SERPL-MCNC: 12 MG/DL (ref 6–20)
CALCIUM SERPL-MCNC: 9.1 MG/DL (ref 8.7–10.5)
CHLORIDE SERPL-SCNC: 107 MMOL/L (ref 95–110)
CO2 SERPL-SCNC: 27 MMOL/L (ref 23–29)
CREAT SERPL-MCNC: 1.1 MG/DL (ref 0.5–1.4)
EST. GFR  (NO RACE VARIABLE): >60 ML/MIN/1.73 M^2
GLUCOSE SERPL-MCNC: 80 MG/DL (ref 70–110)
MAGNESIUM SERPL-MCNC: 1.9 MG/DL (ref 1.6–2.6)
POTASSIUM SERPL-SCNC: 4.3 MMOL/L (ref 3.5–5.1)
SODIUM SERPL-SCNC: 143 MMOL/L (ref 136–145)

## 2024-07-23 PROCEDURE — 83735 ASSAY OF MAGNESIUM: CPT | Performed by: INTERNAL MEDICINE

## 2024-07-23 PROCEDURE — 3077F SYST BP >= 140 MM HG: CPT | Mod: CPTII,S$GLB,, | Performed by: INTERNAL MEDICINE

## 2024-07-23 PROCEDURE — 99999 PR PBB SHADOW E&M-EST. PATIENT-LVL IV: CPT | Mod: PBBFAC,,, | Performed by: INTERNAL MEDICINE

## 2024-07-23 PROCEDURE — 36415 COLL VENOUS BLD VENIPUNCTURE: CPT | Performed by: INTERNAL MEDICINE

## 2024-07-23 PROCEDURE — 3044F HG A1C LEVEL LT 7.0%: CPT | Mod: CPTII,S$GLB,, | Performed by: INTERNAL MEDICINE

## 2024-07-23 PROCEDURE — G2211 COMPLEX E/M VISIT ADD ON: HCPCS | Mod: S$GLB,,, | Performed by: INTERNAL MEDICINE

## 2024-07-23 PROCEDURE — 3060F POS MICROALBUMINURIA REV: CPT | Mod: CPTII,S$GLB,, | Performed by: INTERNAL MEDICINE

## 2024-07-23 PROCEDURE — 83880 ASSAY OF NATRIURETIC PEPTIDE: CPT | Performed by: INTERNAL MEDICINE

## 2024-07-23 PROCEDURE — 3080F DIAST BP >= 90 MM HG: CPT | Mod: CPTII,S$GLB,, | Performed by: INTERNAL MEDICINE

## 2024-07-23 PROCEDURE — 1160F RVW MEDS BY RX/DR IN RCRD: CPT | Mod: CPTII,S$GLB,, | Performed by: INTERNAL MEDICINE

## 2024-07-23 PROCEDURE — 99214 OFFICE O/P EST MOD 30 MIN: CPT | Mod: S$GLB,,, | Performed by: INTERNAL MEDICINE

## 2024-07-23 PROCEDURE — 3008F BODY MASS INDEX DOCD: CPT | Mod: CPTII,S$GLB,, | Performed by: INTERNAL MEDICINE

## 2024-07-23 PROCEDURE — 1159F MED LIST DOCD IN RCRD: CPT | Mod: CPTII,S$GLB,, | Performed by: INTERNAL MEDICINE

## 2024-07-23 PROCEDURE — 4010F ACE/ARB THERAPY RXD/TAKEN: CPT | Mod: CPTII,S$GLB,, | Performed by: INTERNAL MEDICINE

## 2024-07-23 PROCEDURE — 80048 BASIC METABOLIC PNL TOTAL CA: CPT | Performed by: INTERNAL MEDICINE

## 2024-07-23 PROCEDURE — 3066F NEPHROPATHY DOC TX: CPT | Mod: CPTII,S$GLB,, | Performed by: INTERNAL MEDICINE

## 2024-07-23 RX ORDER — SACUBITRIL AND VALSARTAN 97; 103 MG/1; MG/1
1 TABLET, FILM COATED ORAL 2 TIMES DAILY
Qty: 180 TABLET | Refills: 1 | Status: SHIPPED | OUTPATIENT
Start: 2024-07-23

## 2024-07-23 RX ORDER — SPIRONOLACTONE 100 MG/1
100 TABLET, FILM COATED ORAL DAILY
Qty: 30 TABLET | Refills: 3 | Status: SHIPPED | OUTPATIENT
Start: 2024-07-23 | End: 2025-07-23

## 2024-07-23 RX ORDER — CARVEDILOL 25 MG/1
25 TABLET ORAL 2 TIMES DAILY WITH MEALS
Qty: 180 TABLET | Refills: 1 | Status: SHIPPED | OUTPATIENT
Start: 2024-07-23 | End: 2025-07-23

## 2024-07-23 NOTE — PROGRESS NOTES
Subjective:   Patient ID:  Jorge Zuñiga is a 33 y.o. male who presents for cardiac consult of No chief complaint on file.      Referral by: No referring provider defined for this encounter.     Reason for consult: CHF      HPI  The patient came in today for cardiac consult of No chief complaint on file.      Jorge Zuñiga is a 33 y.o. male pt with CHF EF 30-35%, RV dysfunction, pulm HTN, HTN, tobacco use, obesity presents for CVD eval.     Per PCP  HPI here to establish care.  Longstanding hypertension not controlled.  He has a former patient of an outside facility.  Apparently he was diagnosed with a cardiomyopathy and was put on Entresto through the outside cardiologist.  However, it was extremely expensive and he was unable to continue to afford this.  He was also supposed to be scheduled for a left and right heart catheterization but because he was told his from payment was nearly a 1000 dollars, he did not proceed with it because he could not afford that at that time.  We talked extensively about preventative health care and low-salt diet as well as heart health.  He does not have any acute concerns.  Does note that when he does eat higher salt he feels more comfortable sleeping in a recliner but this has been longstanding for the last few years.     5/15/24  BP today elevated 144/102, . BMI 50 - 344 lbs  Since last year he has been seen Dr. Paul Holt - [facility] - was told to get R/[facility].   He had SOB last year appx and stopped smoking and improved but the end of year started having SOB and more edema.   He now has issues with laying flat as well  He has started Entersto and was told $700   ECG - sinus tach, Vrate 110, LAE, LVH    6/25/24  ECHO 5/2024 with severely dilated, LVEF 30-35%, RV mod decreased function, enlarge, mild to mod MR, mild TR, int CVP, PASP 55mmHg.   BP elevated 158/109. . BMI 50     7/23/24    BNP (pg/mL)   Date Value   07/02/2024 2,153 (H)   05/15/2024 1,488 (H)       BNP was higher last visit told pt - Please contact the patient and let them know that their results reveal worsening BNP due to fluid - will increase lasix to Lasix 40mg - two tablets twice a day for 4 days and then one tablet twice a day. And I am adding Aldactone 50mg daily. Need to have low salt diet and monitor BP and weights, follow up with me in 2-3 weeks or sooner.     BP today elevated 140/100. HR 91. BMI 50 - 341 lbs     FH - father -  younger    Results for orders placed during the hospital encounter of 24    Echo    Interpretation Summary    Left Ventricle: The left ventricle is severely dilated. Moderately increased ventricular mass. Mildly increased wall thickness. There is concentric hypertrophy. Severe global hypokinesis present. There is moderately reduced systolic function with a visually estimated ejection fraction of 30 - 35%. Ejection fraction by visual approximation is 33%. Biplane (2D) method of discs ejection fraction is 34%.    Right Ventricle: Right ventricular enlargement. Wall thickness is normal. Systolic function is moderately reduced.    Left Atrium: Left atrium is moderately dilated.    Right Atrium: Right atrium is dilated.    Mitral Valve: There is mild to moderate regurgitation.    Tricuspid Valve: There is mild regurgitation.    Pulmonary Artery: There is moderate pulmonary hypertension. The estimated pulmonary artery systolic pressure is 55 mmHg.    IVC/SVC: Intermediate venous pressure at 8 mmHg.      No cardiac monitor results found for the past 12 months         Past Medical History:   Diagnosis Date    BMI 50.0-59.9, adult 2024    CKD (chronic kidney disease) stage 2, GFR 60-89 ml/min 2024    Concentric left ventricular hypertrophy 2024    Essential hypertension 2024    Former tobacco use 2024    Pure hypercholesterolemia 2019    Systolic heart failure secondary to hypertension 2024       History reviewed. No pertinent  surgical history.    Social History     Tobacco Use    Smoking status: Former     Types: Cigarettes     Passive exposure: Past    Smokeless tobacco: Never   Substance Use Topics    Alcohol use: Yes    Drug use: Never       No family history on file.    Patient's Medications   New Prescriptions    No medications on file   Previous Medications    ALBUTEROL (PROVENTIL/VENTOLIN HFA) 90 MCG/ACTUATION INHALER    Inhale 2 puffs into the lungs every 4 (four) hours as needed.    ASPIRIN (ECOTRIN) 81 MG EC TABLET    Take 1 tablet daily for heart health    ATORVASTATIN (LIPITOR) 40 MG TABLET    Take 1 tablet daily for cholesterol and heart health    AZELASTINE (ASTELIN) 137 MCG (0.1 %) NASAL SPRAY    2 sprays by Nasal route.    CARVEDILOL (COREG) 12.5 MG TABLET    Take 1 tablet (12.5 mg total) by mouth 2 (two) times daily with meals.    FLUTICASONE PROPIONATE (FLONASE) 50 MCG/ACTUATION NASAL SPRAY    2 sprays by Nasal route.    FUROSEMIDE (LASIX) 40 MG TABLET    Take 1 tablet (40 mg total) by mouth 2 (two) times a day. Take two tablets twice a day for 4 days and then one tablet twice a day    SACUBITRIL-VALSARTAN (ENTRESTO)  MG PER TABLET    Take 1 tablet by mouth 2 (two) times daily.    SPIRONOLACTONE (ALDACTONE) 50 MG TABLET    Take 1 tablet (50 mg total) by mouth once daily.   Modified Medications    No medications on file   Discontinued Medications    No medications on file       Review of Systems   Constitutional:  Positive for malaise/fatigue.   HENT: Negative.     Eyes: Negative.    Respiratory:  Positive for shortness of breath.    Cardiovascular:  Positive for orthopnea and leg swelling.   Gastrointestinal: Negative.    Genitourinary: Negative.    Musculoskeletal: Negative.    Skin: Negative.    Neurological: Negative.    Endo/Heme/Allergies: Negative.    Psychiatric/Behavioral: Negative.     All 12 systems otherwise negative.      Wt Readings from Last 3 Encounters:   07/23/24 (!) 154.7 kg (341 lb 0.8 oz)  "  06/25/24 (!) 155.4 kg (342 lb 9.5 oz)   05/28/24 (!) 156.3 kg (344 lb 9.3 oz)     Temp Readings from Last 3 Encounters:   05/28/24 97 °F (36.1 °C) (Tympanic)   05/10/24 97 °F (36.1 °C) (Tympanic)   10/25/19 98 °F (36.7 °C) (Oral)     BP Readings from Last 3 Encounters:   07/23/24 (!) 140/100   06/25/24 (!) 158/109   05/28/24 110/82     Pulse Readings from Last 3 Encounters:   07/23/24 91   06/25/24 (!) 111   05/28/24 108       BP (!) 140/100 (BP Location: Right arm, Patient Position: Sitting, BP Method: Medium (Manual))   Pulse 91   Ht 5' 9" (1.753 m)   Wt (!) 154.7 kg (341 lb 0.8 oz)   SpO2 98%   BMI 50.36 kg/m²     Objective:   Physical Exam  Vitals and nursing note reviewed.   Constitutional:       General: He is not in acute distress.     Appearance: He is well-developed. He is obese. He is not diaphoretic.   HENT:      Head: Normocephalic and atraumatic.      Nose: Nose normal.   Eyes:      General: No scleral icterus.     Conjunctiva/sclera: Conjunctivae normal.   Neck:      Thyroid: No thyromegaly.      Vascular: No JVD.   Cardiovascular:      Rate and Rhythm: Normal rate and regular rhythm.      Heart sounds: S1 normal and S2 normal. Murmur heard.      No friction rub. No gallop. No S3 or S4 sounds.   Pulmonary:      Effort: Pulmonary effort is normal. No respiratory distress.      Breath sounds: Normal breath sounds. No stridor. No wheezing or rales.   Chest:      Chest wall: No tenderness.   Abdominal:      General: Bowel sounds are normal. There is no distension.      Palpations: Abdomen is soft. There is no mass.      Tenderness: There is no abdominal tenderness. There is no rebound.   Genitourinary:     Comments: Deferred  Musculoskeletal:         General: No tenderness or deformity. Normal range of motion.      Cervical back: Normal range of motion and neck supple.   Lymphadenopathy:      Cervical: No cervical adenopathy.   Skin:     General: Skin is warm and dry.      Coloration: Skin is not " pale.      Findings: No erythema or rash.   Neurological:      Mental Status: He is alert and oriented to person, place, and time.      Motor: No abnormal muscle tone.      Coordination: Coordination normal.   Psychiatric:         Behavior: Behavior normal.         Thought Content: Thought content normal.         Judgment: Judgment normal.         Lab Results   Component Value Date     07/02/2024    K 4.3 07/02/2024     07/02/2024    CO2 26 07/02/2024    BUN 12 07/02/2024    CREATININE 1.1 07/02/2024    GLU 86 07/02/2024    HGBA1C 5.3 05/24/2024    MG 2.2 07/02/2024    AST 20 05/24/2024    ALT 15 05/24/2024    ALBUMIN 3.8 05/24/2024    PROT 7.3 05/24/2024    BILITOT 0.8 05/24/2024    WBC 9.00 05/24/2024    HGB 15.3 05/24/2024    HCT 50.1 05/24/2024    MCV 89 05/24/2024     05/24/2024    TSH 0.915 05/24/2024    CHOL 215 (H) 05/24/2024    HDL 49 05/24/2024    LDLCALC 146.6 05/24/2024    TRIG 97 05/24/2024    BNP 2,153 (H) 07/02/2024         BNP (pg/mL)   Date Value   07/02/2024 2,153 (H)   05/15/2024 1,488 (H)          Assessment:      1. Acute combined systolic and diastolic CHF, NYHA class 2    2. Essential hypertension    3. Cardiomegaly    4. Pure hypercholesterolemia    5. Former tobacco use    6. BMI 50.0-59.9, adult    7. RVF (right ventricular failure)    8. Pulmonary hypertension    9. Class 3 congestive heart failure, acute on chronic, combined        Plan:     BI V CHF EF 30-35%, RV dysfunction; pulm HTN, last BNP 1488 --> 2153   - titrate meds -   - cont Entresto - increase dose, --> increase Entersto to max.  lasix - increase to BID x 5 days   - increase Coreg dose   - ECHO 5/2024 with severely dilated, LVEF 30-35%, RV mod decreased function, enlarge, mild to mod MR, mild TR, int CVP, PASP 55mmHg.   - needs ischemic workup once euvolemic  - r/o FRANCESCO - ordered   - repeat labs     2. HTN - uncontrolled   - titrate meds  - increase Coreg and continue more diuresis   - cont low salt diet      3. Obesity BMI 50 - 344 lbs -->342  --> BMI 50 - 341 lb  - cont weight loss     4. H/o Tobacco use  - cont cessation     5. HLD  - cont statin     Visit today included increased complexity associated with the care of the episodic problem CHF addressed and managing the longitudinal care of the patient due to the serious and/or complex managed problem(s)       Thank you for allowing me to participate in this patient's care. Please do not hesitate to contact me with any questions or concerns. Consult note has been forwarded to the referral physician.

## 2024-07-24 ENCOUNTER — TELEPHONE (OUTPATIENT)
Dept: CARDIOLOGY | Facility: CLINIC | Age: 34
End: 2024-07-24
Payer: COMMERCIAL

## 2024-07-24 NOTE — TELEPHONE ENCOUNTER
Contacted patient;patient received and understood results with no questions or concerns.      ----- Message from Calin Henry MD sent at 7/24/2024 12:55 PM CDT -----  Please contact the patient and let them know that their results reveal much better fluid level BNP, kidneys, potassium and magnesium are stable. Continue the new medication doses and follow up with me as scheduled.

## 2024-08-15 ENCOUNTER — TELEPHONE (OUTPATIENT)
Dept: PRIMARY CARE CLINIC | Facility: CLINIC | Age: 34
End: 2024-08-15
Payer: COMMERCIAL

## 2024-09-20 ENCOUNTER — LAB VISIT (OUTPATIENT)
Dept: LAB | Facility: HOSPITAL | Age: 34
End: 2024-09-20
Attending: INTERNAL MEDICINE
Payer: COMMERCIAL

## 2024-09-20 ENCOUNTER — OFFICE VISIT (OUTPATIENT)
Dept: CARDIOLOGY | Facility: CLINIC | Age: 34
End: 2024-09-20
Payer: COMMERCIAL

## 2024-09-20 VITALS
DIASTOLIC BLOOD PRESSURE: 98 MMHG | BODY MASS INDEX: 46.65 KG/M2 | OXYGEN SATURATION: 97 % | WEIGHT: 315 LBS | HEART RATE: 91 BPM | SYSTOLIC BLOOD PRESSURE: 138 MMHG | HEIGHT: 69 IN

## 2024-09-20 DIAGNOSIS — I10 ESSENTIAL HYPERTENSION: ICD-10-CM

## 2024-09-20 DIAGNOSIS — I50.41 ACUTE COMBINED SYSTOLIC AND DIASTOLIC CHF, NYHA CLASS 2: Primary | ICD-10-CM

## 2024-09-20 DIAGNOSIS — I51.7 CARDIOMEGALY: ICD-10-CM

## 2024-09-20 DIAGNOSIS — I50.9 CHRONIC CONGESTIVE HEART FAILURE, UNSPECIFIED HEART FAILURE TYPE: ICD-10-CM

## 2024-09-20 DIAGNOSIS — I27.20 PULMONARY HYPERTENSION: ICD-10-CM

## 2024-09-20 DIAGNOSIS — Z87.891 FORMER TOBACCO USE: ICD-10-CM

## 2024-09-20 DIAGNOSIS — G47.33 OSA (OBSTRUCTIVE SLEEP APNEA): ICD-10-CM

## 2024-09-20 DIAGNOSIS — I50.810 RVF (RIGHT VENTRICULAR FAILURE): ICD-10-CM

## 2024-09-20 DIAGNOSIS — E78.00 PURE HYPERCHOLESTEROLEMIA: ICD-10-CM

## 2024-09-20 DIAGNOSIS — I50.41 ACUTE COMBINED SYSTOLIC AND DIASTOLIC CHF, NYHA CLASS 2: ICD-10-CM

## 2024-09-20 LAB — BNP SERPL-MCNC: 413 PG/ML (ref 0–99)

## 2024-09-20 PROCEDURE — 99999 PR PBB SHADOW E&M-EST. PATIENT-LVL IV: CPT | Mod: PBBFAC,,, | Performed by: INTERNAL MEDICINE

## 2024-09-20 PROCEDURE — 36415 COLL VENOUS BLD VENIPUNCTURE: CPT | Performed by: INTERNAL MEDICINE

## 2024-09-20 PROCEDURE — 83735 ASSAY OF MAGNESIUM: CPT | Performed by: INTERNAL MEDICINE

## 2024-09-20 PROCEDURE — 83880 ASSAY OF NATRIURETIC PEPTIDE: CPT | Performed by: INTERNAL MEDICINE

## 2024-09-20 PROCEDURE — 80048 BASIC METABOLIC PNL TOTAL CA: CPT | Performed by: INTERNAL MEDICINE

## 2024-09-20 RX ORDER — DAPAGLIFLOZIN 10 MG/1
10 TABLET, FILM COATED ORAL DAILY
Qty: 90 TABLET | Refills: 1 | Status: SHIPPED | OUTPATIENT
Start: 2024-09-20

## 2024-09-20 RX ORDER — ISOSORBIDE MONONITRATE 30 MG/1
30 TABLET, EXTENDED RELEASE ORAL NIGHTLY
Qty: 30 TABLET | Refills: 3 | Status: SHIPPED | OUTPATIENT
Start: 2024-09-20 | End: 2025-09-20

## 2024-09-20 NOTE — PROGRESS NOTES
Subjective:   Patient ID:  Jorge Zuñiga is a 34 y.o. male who presents for cardiac consult of No chief complaint on file.      Referral by: No referring provider defined for this encounter.     Reason for consult: CHF      HPI  The patient came in today for cardiac consult of No chief complaint on file.      Jorge Zuñiga is a 34 y.o. male pt with CHF EF 30-35%, RV dysfunction, pulm HTN, HTN, tobacco use, obesity presents for CVD eval.       24    BNP (pg/mL)   Date Value   2024 835 (H)   2024 2,153 (H)   05/15/2024 1,488 (H)      BNP was higher last visit told pt - Please contact the patient and let them know that their results reveal worsening BNP due to fluid - will increase lasix to Lasix 40mg - two tablets twice a day for 4 days and then one tablet twice a day. And I am adding Aldactone 50mg daily. Need to have low salt diet and monitor BP and weights, follow up with me in 2-3 weeks or sooner.   BP today elevated 140/100. HR 91. BMI 50 - 341 lbs     24  BP elevated 138/98. HR 91. BMI 48 - 330 lbs     FH - father -  younger    Results for orders placed during the hospital encounter of 24    Echo    Interpretation Summary    Left Ventricle: The left ventricle is severely dilated. Moderately increased ventricular mass. Mildly increased wall thickness. There is concentric hypertrophy. Severe global hypokinesis present. There is moderately reduced systolic function with a visually estimated ejection fraction of 30 - 35%. Ejection fraction by visual approximation is 33%. Biplane (2D) method of discs ejection fraction is 34%.    Right Ventricle: Right ventricular enlargement. Wall thickness is normal. Systolic function is moderately reduced.    Left Atrium: Left atrium is moderately dilated.    Right Atrium: Right atrium is dilated.    Mitral Valve: There is mild to moderate regurgitation.    Tricuspid Valve: There is mild regurgitation.    Pulmonary Artery: There is  moderate pulmonary hypertension. The estimated pulmonary artery systolic pressure is 55 mmHg.    IVC/SVC: Intermediate venous pressure at 8 mmHg.      No cardiac monitor results found for the past 12 months         Past Medical History:   Diagnosis Date    BMI 50.0-59.9, adult 05/13/2024    CKD (chronic kidney disease) stage 2, GFR 60-89 ml/min 05/28/2024    Concentric left ventricular hypertrophy 05/28/2024    Essential hypertension 05/13/2024    Former tobacco use 05/13/2024    Pure hypercholesterolemia 05/28/2019    Systolic heart failure secondary to hypertension 05/28/2024       History reviewed. No pertinent surgical history.    Social History     Tobacco Use    Smoking status: Former     Types: Cigarettes     Passive exposure: Past    Smokeless tobacco: Never   Substance Use Topics    Alcohol use: Yes    Drug use: Never       No family history on file.    Patient's Medications   New Prescriptions    No medications on file   Previous Medications    ALBUTEROL (PROVENTIL/VENTOLIN HFA) 90 MCG/ACTUATION INHALER    Inhale 2 puffs into the lungs every 4 (four) hours as needed.    ASPIRIN (ECOTRIN) 81 MG EC TABLET    Take 1 tablet daily for heart health    ATORVASTATIN (LIPITOR) 40 MG TABLET    Take 1 tablet daily for cholesterol and heart health    AZELASTINE (ASTELIN) 137 MCG (0.1 %) NASAL SPRAY    2 sprays by Nasal route.    CARVEDILOL (COREG) 25 MG TABLET    Take 1 tablet (25 mg total) by mouth 2 (two) times daily with meals.    FLUTICASONE PROPIONATE (FLONASE) 50 MCG/ACTUATION NASAL SPRAY    2 sprays by Nasal route.    FUROSEMIDE (LASIX) 40 MG TABLET    Take 1 tablet (40 mg total) by mouth 2 (two) times a day. Take two tablets twice a day for 4 days and then one tablet twice a day    SACUBITRIL-VALSARTAN (ENTRESTO)  MG PER TABLET    Take 1 tablet by mouth 2 (two) times daily.    SPIRONOLACTONE (ALDACTONE) 100 MG TABLET    Take 1 tablet (100 mg total) by mouth once daily.   Modified Medications    No  "medications on file   Discontinued Medications    No medications on file       Review of Systems   Constitutional:  Positive for malaise/fatigue.   HENT: Negative.     Eyes: Negative.    Respiratory:  Positive for shortness of breath.    Cardiovascular:  Positive for orthopnea and leg swelling.   Gastrointestinal: Negative.    Genitourinary: Negative.    Musculoskeletal: Negative.    Skin: Negative.    Neurological: Negative.    Endo/Heme/Allergies: Negative.    Psychiatric/Behavioral: Negative.     All 12 systems otherwise negative.      Wt Readings from Last 3 Encounters:   09/20/24 (!) 150 kg (330 lb 11 oz)   07/23/24 (!) 154.7 kg (341 lb 0.8 oz)   06/25/24 (!) 155.4 kg (342 lb 9.5 oz)     Temp Readings from Last 3 Encounters:   05/28/24 97 °F (36.1 °C) (Tympanic)   05/10/24 97 °F (36.1 °C) (Tympanic)   10/25/19 98 °F (36.7 °C) (Oral)     BP Readings from Last 3 Encounters:   09/20/24 (!) 138/98   07/23/24 (!) 140/100   06/25/24 (!) 158/109     Pulse Readings from Last 3 Encounters:   09/20/24 91   07/23/24 91   06/25/24 (!) 111       BP (!) 138/98 (BP Location: Right arm, Patient Position: Sitting, BP Method: Large (Manual))   Pulse 91   Ht 5' 9" (1.753 m)   Wt (!) 150 kg (330 lb 11 oz)   SpO2 97%   BMI 48.83 kg/m²     Objective:   Physical Exam  Vitals and nursing note reviewed.   Constitutional:       General: He is not in acute distress.     Appearance: He is well-developed. He is obese. He is not diaphoretic.   HENT:      Head: Normocephalic and atraumatic.      Nose: Nose normal.   Eyes:      General: No scleral icterus.     Conjunctiva/sclera: Conjunctivae normal.   Neck:      Thyroid: No thyromegaly.      Vascular: No JVD.   Cardiovascular:      Rate and Rhythm: Normal rate and regular rhythm.      Heart sounds: S1 normal and S2 normal. Murmur heard.      No friction rub. No gallop. No S3 or S4 sounds.   Pulmonary:      Effort: Pulmonary effort is normal. No respiratory distress.      Breath sounds: " Normal breath sounds. No stridor. No wheezing or rales.   Chest:      Chest wall: No tenderness.   Abdominal:      General: Bowel sounds are normal. There is no distension.      Palpations: Abdomen is soft. There is no mass.      Tenderness: There is no abdominal tenderness. There is no rebound.   Genitourinary:     Comments: Deferred  Musculoskeletal:         General: No tenderness or deformity. Normal range of motion.      Cervical back: Normal range of motion and neck supple.   Lymphadenopathy:      Cervical: No cervical adenopathy.   Skin:     General: Skin is warm and dry.      Coloration: Skin is not pale.      Findings: No erythema or rash.   Neurological:      Mental Status: He is alert and oriented to person, place, and time.      Motor: No abnormal muscle tone.      Coordination: Coordination normal.   Psychiatric:         Behavior: Behavior normal.         Thought Content: Thought content normal.         Judgment: Judgment normal.         Lab Results   Component Value Date     07/23/2024    K 4.3 07/23/2024     07/23/2024    CO2 27 07/23/2024    BUN 12 07/23/2024    CREATININE 1.1 07/23/2024    GLU 80 07/23/2024    HGBA1C 5.3 05/24/2024    MG 1.9 07/23/2024    AST 20 05/24/2024    ALT 15 05/24/2024    ALBUMIN 3.8 05/24/2024    PROT 7.3 05/24/2024    BILITOT 0.8 05/24/2024    WBC 9.00 05/24/2024    HGB 15.3 05/24/2024    HCT 50.1 05/24/2024    MCV 89 05/24/2024     05/24/2024    TSH 0.915 05/24/2024    CHOL 215 (H) 05/24/2024    HDL 49 05/24/2024    LDLCALC 146.6 05/24/2024    TRIG 97 05/24/2024     (H) 07/23/2024         BNP (pg/mL)   Date Value   07/23/2024 835 (H)   07/02/2024 2,153 (H)   05/15/2024 1,488 (H)          Assessment:      1. Acute combined systolic and diastolic CHF, NYHA class 2    2. Cardiomegaly    3. Essential hypertension    4. Pure hypercholesterolemia    5. Former tobacco use    6. Pulmonary hypertension    7. BMI 50.0-59.9, adult    8. RVF (right  ventricular failure)    9. Chronic congestive heart failure, unspecified heart failure type    10. FRANCESCO (obstructive sleep apnea)          Plan:     BI V CHF EF 30-35%, RV dysfunction; pulm HTN, last BNP 1488 --> 2153  --> 835   - titrate meds -   - cont Entresto - increase dose, --> increase Entersto to max.  lasix - increased to BID x 5 days   - increase Coreg dose   - ECHO 5/2024 with severely dilated, LVEF 30-35%, RV mod decreased function, enlarge, mild to mod MR, mild TR, int CVP, PASP 55mmHg.   - needs ischemic workup once euvolemic  - r/o FRANCESCO - ordered   - repeat labs   - add Imdur   -add farxiga     2. HTN - uncontrolled   - titrate meds  - increase Coreg and continue more diuresis   - cont low salt diet     3. Obesity BMI 50 - 344 lbs -->342  --> BMI 50 - 341 lb BMI 48 - 330 lbs   - cont weight loss     4. H/o Tobacco use  - cont cessation     5. HLD  - cont statin     Visit today included increased complexity associated with the care of the episodic problem CHF addressed and managing the longitudinal care of the patient due to the serious and/or complex managed problem(s)       Thank you for allowing me to participate in this patient's care. Please do not hesitate to contact me with any questions or concerns. Consult note has been forwarded to the referral physician.

## 2024-09-21 LAB
ANION GAP SERPL CALC-SCNC: 11 MMOL/L (ref 8–16)
BUN SERPL-MCNC: 14 MG/DL (ref 6–20)
CALCIUM SERPL-MCNC: 9.4 MG/DL (ref 8.7–10.5)
CHLORIDE SERPL-SCNC: 104 MMOL/L (ref 95–110)
CO2 SERPL-SCNC: 24 MMOL/L (ref 23–29)
CREAT SERPL-MCNC: 1.2 MG/DL (ref 0.5–1.4)
EST. GFR  (NO RACE VARIABLE): >60 ML/MIN/1.73 M^2
GLUCOSE SERPL-MCNC: 83 MG/DL (ref 70–110)
MAGNESIUM SERPL-MCNC: 2.1 MG/DL (ref 1.6–2.6)
POTASSIUM SERPL-SCNC: 4.2 MMOL/L (ref 3.5–5.1)
SODIUM SERPL-SCNC: 139 MMOL/L (ref 136–145)

## 2024-09-23 ENCOUNTER — TELEPHONE (OUTPATIENT)
Dept: CARDIOLOGY | Facility: CLINIC | Age: 34
End: 2024-09-23
Payer: COMMERCIAL

## 2024-09-23 ENCOUNTER — PATIENT MESSAGE (OUTPATIENT)
Dept: CARDIOLOGY | Facility: CLINIC | Age: 34
End: 2024-09-23
Payer: COMMERCIAL

## 2024-09-23 NOTE — TELEPHONE ENCOUNTER
Called and wasn't able to leave a voice message. PT doesn't have a voicemail setup. Sent a portal message     ----- Message from Calin Henry MD sent at 9/23/2024  2:10 PM CDT -----  Please contact the patient and let them know that their results show good improvement in fluid level - BNP; kidneys, potassium and magnesium are normal/stable, continue meds as discussed and monitor BP and weight and follow up with me as scheduled or sooner if needed.

## 2025-02-28 ENCOUNTER — OFFICE VISIT (OUTPATIENT)
Dept: PRIMARY CARE CLINIC | Facility: CLINIC | Age: 35
End: 2025-02-28

## 2025-02-28 VITALS
WEIGHT: 315 LBS | HEART RATE: 81 BPM | SYSTOLIC BLOOD PRESSURE: 130 MMHG | BODY MASS INDEX: 51.06 KG/M2 | DIASTOLIC BLOOD PRESSURE: 88 MMHG | OXYGEN SATURATION: 97 % | TEMPERATURE: 98 F

## 2025-02-28 DIAGNOSIS — E78.00 PURE HYPERCHOLESTEROLEMIA: ICD-10-CM

## 2025-02-28 DIAGNOSIS — I50.810 RVF (RIGHT VENTRICULAR FAILURE): ICD-10-CM

## 2025-02-28 DIAGNOSIS — E66.01 SEVERE OBESITY: ICD-10-CM

## 2025-02-28 DIAGNOSIS — M79.672 PAIN IN BOTH FEET: Primary | ICD-10-CM

## 2025-02-28 DIAGNOSIS — M79.671 PAIN IN BOTH FEET: Primary | ICD-10-CM

## 2025-02-28 DIAGNOSIS — M10.9 GOUT, UNSPECIFIED CAUSE, UNSPECIFIED CHRONICITY, UNSPECIFIED SITE: ICD-10-CM

## 2025-02-28 PROCEDURE — 99214 OFFICE O/P EST MOD 30 MIN: CPT | Mod: PBBFAC,PN | Performed by: INTERNAL MEDICINE

## 2025-02-28 PROCEDURE — 99999 PR PBB SHADOW E&M-EST. PATIENT-LVL IV: CPT | Mod: PBBFAC,,, | Performed by: INTERNAL MEDICINE

## 2025-02-28 RX ORDER — LINEZOLID 600 MG/1
600 TABLET, FILM COATED ORAL EVERY 12 HOURS
COMMUNITY
End: 2025-02-28 | Stop reason: HOSPADM

## 2025-02-28 RX ORDER — SACUBITRIL AND VALSARTAN 97; 103 MG/1; MG/1
1 TABLET, FILM COATED ORAL 2 TIMES DAILY
Qty: 180 TABLET | Refills: 0 | Status: SHIPPED | OUTPATIENT
Start: 2025-02-28

## 2025-02-28 RX ORDER — CARVEDILOL PHOSPHATE 40 MG/1
40 CAPSULE, EXTENDED RELEASE ORAL DAILY
COMMUNITY

## 2025-02-28 RX ORDER — OLMESARTAN MEDOXOMIL 40 MG/1
40 TABLET ORAL DAILY
COMMUNITY
End: 2025-02-28

## 2025-02-28 RX ORDER — COLCHICINE 0.6 MG/1
TABLET ORAL
Qty: 30 TABLET | Refills: 0 | Status: SHIPPED | OUTPATIENT
Start: 2025-02-28

## 2025-02-28 RX ORDER — KETOROLAC TROMETHAMINE 10 MG/1
10 TABLET, FILM COATED ORAL EVERY 6 HOURS PRN
COMMUNITY
Start: 2025-01-28 | End: 2025-02-28

## 2025-02-28 RX ORDER — ATORVASTATIN CALCIUM 40 MG/1
TABLET, FILM COATED ORAL
Qty: 90 TABLET | Refills: 3 | Status: SHIPPED | OUTPATIENT
Start: 2025-02-28

## 2025-02-28 RX ORDER — COLCHICINE 0.6 MG/1
0.6 TABLET ORAL 2 TIMES DAILY
COMMUNITY
Start: 2025-01-28 | End: 2025-02-28 | Stop reason: SDUPTHER

## 2025-02-28 NOTE — LETTER
February 28, 2025      [facility]  [address]  [place] [address]  Phone: [phone]  Fax: [phone]       Patient: Jorge Zuñiga   YOB: 1990  Date of Visit: 02/28/2025    To Whom It May Concern:    Nevin Zuñiga  was at [facility] on 02/28/2025. Please excuse Jorge absence from 2/25/2025 thru 3/4/2025. The patient may return to work on 03/05/2025 with no restrictions. If you have any questions or concerns, or if I can be of further assistance, please do not hesitate to contact me.    Sincerely,        Daisy Hitchcock MA

## 2025-02-28 NOTE — PROGRESS NOTES
Subjective     Patient ID: Jorge Zuñiga is a 34 y.o. male.    Chief Complaint: Gout (Patient states he's currently experiencing a gout flare in his feet (right is greater than left))      HPI  pt states he recently had a flare of gout in left great toe.  Went to ; though initially was OA due to previous injury to the area, old.  However, had classic gout signs later on.  He then began to have very similar signs in his right great toe.  Used colchicine sparingly in the past and it really helped.  Sees cards regularly.  Monitors bp at home.  Needs refills of some heart medications to bridge him until he sees cards.  Reports compliance with medications.    Past Medical History:   Diagnosis Date    BMI 50.0-59.9, adult 05/13/2024    CKD (chronic kidney disease) stage 2, GFR 60-89 ml/min 05/28/2024    Concentric left ventricular hypertrophy 05/28/2024    Essential hypertension 05/13/2024    Former tobacco use 05/13/2024    Pure hypercholesterolemia 05/28/2019    Systolic heart failure secondary to hypertension 05/28/2024     Review of patient's allergies indicates:  No Known Allergies  History reviewed. No pertinent surgical history.  No family history on file.  Social History[1]      /88 (BP Location: Right arm)   Pulse 81   Temp 98.2 °F (36.8 °C) (Tympanic)   Wt (!) 156.8 kg (345 lb 12.7 oz)   SpO2 97%   BMI 51.06 kg/m²   Outpatient Medications as of 2/28/2025   Medication Sig Dispense Refill    aspirin (ECOTRIN) 81 MG EC tablet Take 1 tablet daily for heart health 90 tablet 3    furosemide (LASIX) 40 MG tablet Take 1 tablet (40 mg total) by mouth 2 (two) times a day. Take two tablets twice a day for 4 days and then one tablet twice a day 90 tablet 1    albuterol (PROVENTIL/VENTOLIN HFA) 90 mcg/actuation inhaler Inhale 2 puffs into the lungs every 4 (four) hours as needed.      azelastine (ASTELIN) 137 mcg (0.1 %) nasal spray 2 sprays by Nasal route.      carvediloL (COREG) 25 MG tablet Take 1  tablet (25 mg total) by mouth 2 (two) times daily with meals. (Patient not taking: Reported on 2/28/2025) 180 tablet 1    dapagliflozin propanediol (FARXIGA) 10 mg tablet Take 1 tablet (10 mg total) by mouth once daily. 90 tablet 1    fluticasone propionate (FLONASE) 50 mcg/actuation nasal spray 2 sprays by Nasal route.      isosorbide mononitrate (IMDUR) 30 MG 24 hr tablet Take 1 tablet (30 mg total) by mouth every evening. 30 tablet 3    spironolactone (ALDACTONE) 100 MG tablet Take 1 tablet (100 mg total) by mouth once daily. 30 tablet 3     No current facility-administered medications on file as of 2/28/2025.       Review of Systems   All other systems reviewed and are negative.         Objective     Physical Exam  Constitutional:       Appearance: Normal appearance. He is obese.   HENT:      Head: Normocephalic and atraumatic.   Eyes:      Conjunctiva/sclera: Conjunctivae normal.   Cardiovascular:      Rate and Rhythm: Normal rate.      Heart sounds: Murmur heard.   Pulmonary:      Effort: No respiratory distress.   Musculoskeletal:      Cervical back: Neck supple.   Skin:     General: Skin is dry.   Neurological:      Mental Status: He is alert and oriented to person, place, and time.   Psychiatric:         Mood and Affect: Mood normal.         Behavior: Behavior normal.            Assessment and Plan     1. Pain in both feet  -     colchicine (COLCRYS) 0.6 mg tablet; Take 1 tablet at onset of gout flare.  May take for 3 days only.  Monitor blood pressure.  Dispense: 30 tablet; Refill: 0    2. Gout, unspecified cause, unspecified chronicity, unspecified site  -     colchicine (COLCRYS) 0.6 mg tablet; Take 1 tablet at onset of gout flare.  May take for 3 days only.  Monitor blood pressure.  Dispense: 30 tablet; Refill: 0    3. Severe obesity  Comments:  LSM; optimize risk factors; regular labs scheduled    4. RVF (right ventricular failure)  -     sacubitriL-valsartan (ENTRESTO)  mg per tablet; Take 1  tablet by mouth 2 (two) times daily.  Dispense: 180 tablet; Refill: 0    5. Pure hypercholesterolemia  Comments:  started Lipitor May 2024; [facility]  Orders:  -     atorvastatin (LIPITOR) 40 MG tablet; Take 1 tablet daily for cholesterol and heart health  Dispense: 90 tablet; Refill: 3       Educated on medications and heart history.    We discussed in detail that NSAIDS have to be taken, if at all, very carefully with his hx.  He will let us know if/when he thinks he is having future gout flares.    Follow up in about 6 months (around 8/28/2025).      There is no immunization history on file for this patient.                [1]   Social History  Socioeconomic History    Marital status:    Tobacco Use    Smoking status: Former     Types: Cigarettes     Passive exposure: Past    Smokeless tobacco: Never   Substance and Sexual Activity    Alcohol use: Yes    Drug use: Never    Sexual activity: Yes     Partners: Female

## 2025-03-25 DIAGNOSIS — M79.671 PAIN IN BOTH FEET: ICD-10-CM

## 2025-03-25 DIAGNOSIS — M79.672 PAIN IN BOTH FEET: ICD-10-CM

## 2025-03-25 DIAGNOSIS — M10.9 GOUT, UNSPECIFIED CAUSE, UNSPECIFIED CHRONICITY, UNSPECIFIED SITE: ICD-10-CM

## 2025-03-25 NOTE — TELEPHONE ENCOUNTER
Refill Routing Note   Medication(s) are not appropriate for processing by [facility] for the following reason(s):        New or recently adjusted medication    [facility] action(s):  Defer   Requires labs : Yes               Appointments  past 12m or future 3m with PCP    Date Provider   Last Visit   2/28/2025 Mercedes Aguilera MD   Next Visit   8/28/2025 Mercedes Aguilera MD   ED visits in past 90 days: 0        Note composed:4:20 PM 03/25/2025

## 2025-03-25 NOTE — TELEPHONE ENCOUNTER
Care Due:                  Date            Visit Type   Department     Provider  --------------------------------------------------------------------------------                                EP -                              PRIMARY      [facility] PRIMARY  Last Visit: 02-      CARE ([facility])   CARE           Mercedes Aguilera                              ESTABLISHED   [facility] PRIMARY  Next Visit: 08-      PATIENT      CARE           Mercedes Aguilera                                                            Last  Test          Frequency    Reason                     Performed    Due Date  --------------------------------------------------------------------------------    CMP.........  12 months..  atorvastatin.............  05- 05-    Lipid Panel.  12 months..  atorvastatin.............  05- 05-    Uric Acid...  12 months..  colchicine...............  Not Found    Overdue    [employer] Embedded Care Due Messages. Reference number: [id number].   3/25/2025 11:22:21 AM CDT

## 2025-03-26 RX ORDER — COLCHICINE 0.6 MG/1
TABLET ORAL
Qty: 30 TABLET | Refills: 0 | Status: SHIPPED | OUTPATIENT
Start: 2025-03-26

## 2025-04-29 DIAGNOSIS — I50.810 RVF (RIGHT VENTRICULAR FAILURE): ICD-10-CM

## 2025-04-29 RX ORDER — SACUBITRIL AND VALSARTAN 97; 103 MG/1; MG/1
1 TABLET, FILM COATED ORAL 2 TIMES DAILY
Qty: 180 TABLET | Refills: 1 | Status: SHIPPED | OUTPATIENT
Start: 2025-04-29

## 2025-04-29 RX ORDER — CARVEDILOL 25 MG/1
25 TABLET ORAL 2 TIMES DAILY WITH MEALS
Qty: 180 TABLET | Refills: 1 | OUTPATIENT
Start: 2025-04-29 | End: 2026-04-29

## 2025-04-29 NOTE — TELEPHONE ENCOUNTER
No care due was identified.  [facility] Embedded Care Due Messages. Reference number: [id number].   4/29/2025 12:19:49 PM CDT

## 2025-04-29 NOTE — TELEPHONE ENCOUNTER
Refill Decision Note   Jorge Zuñiga  is requesting a refill authorization.  Brief Assessment and Rationale for Refill:  Approve     Medication Therapy Plan:         Comments:     Note composed:12:34 PM 04/29/2025